# Patient Record
Sex: FEMALE | Race: BLACK OR AFRICAN AMERICAN | Employment: OTHER | URBAN - METROPOLITAN AREA
[De-identification: names, ages, dates, MRNs, and addresses within clinical notes are randomized per-mention and may not be internally consistent; named-entity substitution may affect disease eponyms.]

---

## 2017-05-01 ENCOUNTER — HOSPITAL ENCOUNTER (EMERGENCY)
Age: 57
Discharge: HOME | End: 2017-05-01
Attending: EMERGENCY MEDICINE | Admitting: EMERGENCY MEDICINE
Payer: COMMERCIAL

## 2017-05-01 VITALS
TEMPERATURE: 98.4 F | BODY MASS INDEX: 28.12 KG/M2 | HEIGHT: 66 IN | OXYGEN SATURATION: 99 % | WEIGHT: 175 LBS | SYSTOLIC BLOOD PRESSURE: 178 MMHG | DIASTOLIC BLOOD PRESSURE: 101 MMHG | HEART RATE: 77 BPM | RESPIRATION RATE: 16 BRPM

## 2017-05-01 DIAGNOSIS — I10 ESSENTIAL HYPERTENSION: ICD-10-CM

## 2017-05-01 DIAGNOSIS — G43.009 NONINTRACTABLE MIGRAINE, UNSPECIFIED MIGRAINE TYPE: Primary | ICD-10-CM

## 2017-05-01 DIAGNOSIS — M54.6 ACUTE LEFT-SIDED THORACIC BACK PAIN: ICD-10-CM

## 2017-05-01 PROCEDURE — 74011250636 HC RX REV CODE- 250/636: Performed by: PHYSICIAN ASSISTANT

## 2017-05-01 PROCEDURE — 99284 EMERGENCY DEPT VISIT MOD MDM: CPT

## 2017-05-01 PROCEDURE — 74011250637 HC RX REV CODE- 250/637: Performed by: PHYSICIAN ASSISTANT

## 2017-05-01 PROCEDURE — 96372 THER/PROPH/DIAG INJ SC/IM: CPT

## 2017-05-01 RX ORDER — TRAMADOL HYDROCHLORIDE 50 MG/1
50 TABLET ORAL
Qty: 15 TAB | Refills: 0 | Status: SHIPPED | OUTPATIENT
Start: 2017-05-01 | End: 2019-07-29

## 2017-05-01 RX ORDER — KETOROLAC TROMETHAMINE 30 MG/ML
30 INJECTION, SOLUTION INTRAMUSCULAR; INTRAVENOUS
Status: COMPLETED | OUTPATIENT
Start: 2017-05-01 | End: 2017-05-01

## 2017-05-01 RX ORDER — AMLODIPINE BESYLATE 5 MG/1
5 TABLET ORAL
Status: COMPLETED | OUTPATIENT
Start: 2017-05-01 | End: 2017-05-01

## 2017-05-01 RX ORDER — PROPRANOLOL HYDROCHLORIDE 10 MG/1
80 TABLET ORAL
Status: COMPLETED | OUTPATIENT
Start: 2017-05-01 | End: 2017-05-01

## 2017-05-01 RX ORDER — LISINOPRIL 20 MG/1
20 TABLET ORAL
Status: COMPLETED | OUTPATIENT
Start: 2017-05-01 | End: 2017-05-01

## 2017-05-01 RX ORDER — BUTALBITAL, ACETAMINOPHEN AND CAFFEINE 50; 325; 40 MG/1; MG/1; MG/1
2 TABLET ORAL
Status: COMPLETED | OUTPATIENT
Start: 2017-05-01 | End: 2017-05-01

## 2017-05-01 RX ORDER — BUTALBITAL, ACETAMINOPHEN AND CAFFEINE 300; 40; 50 MG/1; MG/1; MG/1
1 CAPSULE ORAL
Qty: 15 CAP | Refills: 0 | Status: SHIPPED | OUTPATIENT
Start: 2017-05-01 | End: 2019-07-29

## 2017-05-01 RX ADMIN — PROPRANOLOL HYDROCHLORIDE 80 MG: 10 TABLET ORAL at 17:16

## 2017-05-01 RX ADMIN — BUTALBITAL, ACETAMINOPHEN, AND CAFFEINE 2 TABLET: 50; 325; 40 TABLET ORAL at 17:06

## 2017-05-01 RX ADMIN — AMLODIPINE BESYLATE 5 MG: 5 TABLET ORAL at 17:06

## 2017-05-01 RX ADMIN — LISINOPRIL 20 MG: 20 TABLET ORAL at 17:06

## 2017-05-01 RX ADMIN — KETOROLAC TROMETHAMINE 30 MG: 30 INJECTION, SOLUTION INTRAMUSCULAR at 18:25

## 2017-05-01 NOTE — ED NOTES
Bedside shift change report given to GENO An RN (oncoming nurse) by SHERYL Brown RN (offgoing nurse). Report included the following information SBAR.

## 2017-05-01 NOTE — ED TRIAGE NOTES
Patient presents with c/o \"throbbing\" left upper back pain. Hx of shingles. Denies any recent injury. Also has c/o migraine headache unrelieved with \"normal\" headaches medications prescribed to patient.

## 2017-05-01 NOTE — ED NOTES
According to pt hear for HA and back pain. Pt during assessment B/P elevated. Emergency Department Nursing Plan of Care       The Nursing Plan of Care is developed from the Nursing assessment and Emergency Department Attending provider initial evaluation. The plan of care may be reviewed in the ED Provider note.     The Plan of Care was developed with the following considerations:   Patient / Family readiness to learn indicated by:verbalized understanding  Persons(s) to be included in education: patient  Barriers to Learning/Limitations:No    Signed     Tina Main RN    5/1/2017   5:12 PM

## 2017-05-01 NOTE — ED NOTES
Pt educated about improving her diet, decreasing smoking and taking her med's as prescribe. Pt acknowledge understanding and will try to improve.

## 2017-05-01 NOTE — ED PROVIDER NOTES
Patient is a 59 y.o. female presenting with migraines. Migraine    Pertinent negatives include no fever, no palpitations, no shortness of breath, no weakness, no nausea and no vomiting. To ED with complaints of typical migraine HA and left upper back pain. Early this AM (about 3am) awoke with \"my typical migraine\" describes as vice-like to both temples. Some photophobia. No nausea/vomiting. Minimal improvement with zomig. No vision changes. No SOB. Also notes has had several weeks of left mid lateral back pain. This is similar to pain she had last year and was evaluated by her pcp with renal US and CXR which were \"normal\". No trauma. This pain is constant ache with clear link to deep breath or twisting torso, reaching overhead with left arm, or sudden sneeze. Has tried some OTC meds for this with minimal effect. No past medical history on file. No past surgical history on file. No family history on file. Pt lists that she is on Norvasc, Zomig, Lisinopril, Propranolol and Vitamin D-3, although has missed some of her Propranolol and has not taken anything today. Her list includes following blood pressure medications:    Norvasc 5mg / D  Lisinopril 20mg/D  Propranolol 80 mg/D   Social History     Social History    Marital status:      Spouse name: N/A    Number of children: N/A    Years of education: N/A     Occupational History    Not on file. Social History Main Topics    Smoking status: Not on file    Smokeless tobacco: Not on file    Alcohol use Not on file    Drug use: Not on file    Sexual activity: Not on file     Other Topics Concern    Not on file     Social History Narrative         ALLERGIES: Shellfish derived; Iodine; and Codeine    Review of Systems   Constitutional: Negative for chills and fever. HENT: Negative for congestion, dental problem, ear discharge, hearing loss, rhinorrhea and sore throat. Eyes: Negative for pain and discharge.    Respiratory: Negative for cough, chest tightness, shortness of breath, wheezing and stridor. Cardiovascular: Negative for chest pain, palpitations and leg swelling. Gastrointestinal: Negative for abdominal pain, nausea and vomiting. Genitourinary: Negative for dysuria, frequency and urgency. Musculoskeletal: Positive for back pain. Negative for gait problem, joint swelling and neck pain. Skin: Negative for rash and wound. Neurological: Negative for seizures, syncope, weakness, light-headedness, numbness and headaches. Psychiatric/Behavioral: Negative for confusion and hallucinations. The patient is not nervous/anxious. All other systems reviewed and are negative. Vitals:    05/01/17 1716 05/01/17 1730 05/01/17 1800 05/01/17 1925   BP: (!) 192/101 (!) 196/102 (!) 165/104 158/67   Pulse: 78   74   Resp: 16   16   Temp:       SpO2:    99%   Weight:       Height:                Physical Exam   Constitutional: She is oriented to person, place, and time. She appears well-developed and well-nourished. HENT:   Head: Normocephalic and atraumatic. Right Ear: External ear normal.   Left Ear: External ear normal.   Nose: Nose normal.   Mouth/Throat: Oropharynx is clear and moist.   Eyes: Conjunctivae and EOM are normal. Pupils are equal, round, and reactive to light. Neck: Normal range of motion. Neck supple. Cardiovascular: Normal rate, regular rhythm and normal heart sounds. Pulmonary/Chest: Effort normal and breath sounds normal. She has no wheezes. She has no rales. Left Back:   No bony tenderness to T or L spine. No rib tenderness. No visible rash. Reproducible tenderness to L mid clavicular and lateral chest wall tenderness. Pain with twisting torso to right. Reproduces pain when reaches left arm overhead. Shoulder NT. Abdominal: Soft. Bowel sounds are normal. There is no tenderness. There is no rebound and no guarding. Musculoskeletal: Normal range of motion.    Neurological: She is alert and oriented to person, place, and time. She has normal strength and normal reflexes. She displays no tremor. She exhibits normal muscle tone. She displays a negative Romberg sign. Coordination normal.   Reflex Scores:       Tricep reflexes are 2+ on the right side and 2+ on the left side. Brachioradialis reflexes are 2+ on the right side and 2+ on the left side. Patellar reflexes are 2+ on the right side and 2+ on the left side. CN 3-12 intact. Speech clear. Skin: Skin is warm and dry. Psychiatric: She has a normal mood and affect. Her behavior is normal.            MDM  Number of Diagnoses or Management Options  Diagnosis management comments: DDX:  Migraine HA, Tension HA, post herpetic neuralgia, HA r/t HTN  Return of similar pain pattern after one year and reproducible nature of pain argue against more serious diagnosis. ED Course       Procedures             5:43 PM  Recheck Ms. Ross [place]. Sleeping. On waking notes she is feeling much better. HA minimal \"only a little behind my eyes\". Back pain still there. Will give Toradol. 8:10 PM  Feels better. Still with some back pain, but tolerable.

## 2017-05-02 NOTE — DISCHARGE INSTRUCTIONS
High Blood Pressure: Care Instructions  Your Care Instructions  If your blood pressure is usually above 140/90, you have high blood pressure, or hypertension. That means the top number is 140 or higher or the bottom number is 90 or higher, or both. Despite what a lot of people think, high blood pressure usually doesn't cause headaches or make you feel dizzy or lightheaded. It usually has no symptoms. But it does increase your risk for heart attack, stroke, and kidney or eye damage. The higher your blood pressure, the more your risk increases. Your doctor will give you a goal for your blood pressure. Your goal will be based on your health and your age. An example of a goal is to keep your blood pressure below 140/90. Lifestyle changes, such as eating healthy and being active, are always important to help lower blood pressure. You might also take medicine to reach your blood pressure goal.  Follow-up care is a key part of your treatment and safety. Be sure to make and go to all appointments, and call your doctor if you are having problems. It's also a good idea to know your test results and keep a list of the medicines you take. How can you care for yourself at home? Medical treatment  · If you stop taking your medicine, your blood pressure will go back up. You may take one or more types of medicine to lower your blood pressure. Be safe with medicines. Take your medicine exactly as prescribed. Call your doctor if you think you are having a problem with your medicine. · Talk to your doctor before you start taking aspirin every day. Aspirin can help certain people lower their risk of a heart attack or stroke. But taking aspirin isn't right for everyone, because it can cause serious bleeding. · See your doctor regularly. You may need to see the doctor more often at first or until your blood pressure comes down.   · If you are taking blood pressure medicine, talk to your doctor before you take decongestants or anti-inflammatory medicine, such as ibuprofen. Some of these medicines can raise blood pressure. · Learn how to check your blood pressure at home. Lifestyle changes  · Stay at a healthy weight. This is especially important if you put on weight around the waist. Losing even 10 pounds can help you lower your blood pressure. · If your doctor recommends it, get more exercise. Walking is a good choice. Bit by bit, increase the amount you walk every day. Try for at least 30 minutes on most days of the week. You also may want to swim, bike, or do other activities. · Avoid or limit alcohol. Talk to your doctor about whether you can drink any alcohol. · Try to limit how much sodium you eat to less than 2,300 milligrams (mg) a day. Your doctor may ask you to try to eat less than 1,500 mg a day. · Eat plenty of fruits (such as bananas and oranges), vegetables, legumes, whole grains, and low-fat dairy products. · Lower the amount of saturated fat in your diet. Saturated fat is found in animal products such as milk, cheese, and meat. Limiting these foods may help you lose weight and also lower your risk for heart disease. · Do not smoke. Smoking increases your risk for heart attack and stroke. If you need help quitting, talk to your doctor about stop-smoking programs and medicines. These can increase your chances of quitting for good. When should you call for help? Call 911 anytime you think you may need emergency care. This may mean having symptoms that suggest that your blood pressure is causing a serious heart or blood vessel problem. Your blood pressure may be over 180/110. For example, call 911 if:  · You have symptoms of a heart attack. These may include:  ¨ Chest pain or pressure, or a strange feeling in the chest.  ¨ Sweating. ¨ Shortness of breath. ¨ Nausea or vomiting. ¨ Pain, pressure, or a strange feeling in the back, neck, jaw, or upper belly or in one or both shoulders or arms.   ¨ Lightheadedness or sudden weakness. ¨ A fast or irregular heartbeat. · You have symptoms of a stroke. These may include:  ¨ Sudden numbness, tingling, weakness, or loss of movement in your face, arm, or leg, especially on only one side of your body. ¨ Sudden vision changes. ¨ Sudden trouble speaking. ¨ Sudden confusion or trouble understanding simple statements. ¨ Sudden problems with walking or balance. ¨ A sudden, severe headache that is different from past headaches. · You have severe back or belly pain. Do not wait until your blood pressure comes down on its own. Get help right away. Call your doctor now or seek immediate care if:  · Your blood pressure is much higher than normal (such as 180/110 or higher), but you don't have symptoms. · You think high blood pressure is causing symptoms, such as:  ¨ Severe headache. ¨ Blurry vision. Watch closely for changes in your health, and be sure to contact your doctor if:  · Your blood pressure measures 140/90 or higher at least 2 times. That means the top number is 140 or higher or the bottom number is 90 or higher, or both. · You think you may be having side effects from your blood pressure medicine. · Your blood pressure is usually normal, but it goes above normal at least 2 times. Where can you learn more? Go to http://cortez-onelia.info/. Enter O834 in the search box to learn more about \"High Blood Pressure: Care Instructions. \"  Current as of: August 8, 2016  Content Version: 11.2  © 4727-4048 [employer]. Care instructions adapted under license by [employer] (which disclaims liability or warranty for this information). If you have questions about a medical condition or this instruction, always ask your healthcare professional. [place] [address] any warranty or liability for your use of this information.        Migraine Headache: Care Instructions  Your Care Instructions  Migraines are painful, throbbing headaches that often start on one side of the head. They may cause nausea and vomiting and make you sensitive to light, sound, or smell. Without treatment, migraines can last from 4 hours to a few days. Medicines can help prevent migraines or stop them after they have started. Your doctor can help you find which ones work best for you. Follow-up care is a key part of your treatment and safety. Be sure to make and go to all appointments, and call your doctor if you are having problems. It's also a good idea to know your test results and keep a list of the medicines you take. How can you care for yourself at home? · Do not drive if you have taken a prescription pain medicine. · Rest in a quiet, dark room until your headache is gone. Close your eyes, and try to relax or go to sleep. Don't watch TV or read. · Put a cold, moist cloth or cold pack on the painful area for 10 to 20 minutes at a time. Put a thin cloth between the cold pack and your skin. · Use a warm, moist towel or a heating pad set on low to relax tight shoulder and neck muscles. · Have someone gently massage your neck and shoulders. · Take your medicines exactly as prescribed. Call your doctor if you think you are having a problem with your medicine. You will get more details on the specific medicines your doctor prescribes. · Be careful not to take pain medicine more often than the instructions allow. You could get worse or more frequent headaches when the medicine wears off. To prevent migraines  · Keep a headache diary so you can figure out what triggers your headaches. Avoiding triggers may help you prevent headaches. Record when each headache began, how long it lasted, and what the pain was like. (Was it throbbing, aching, stabbing, or dull?) Write down any other symptoms you had with the headache, such as nausea, flashing lights or dark spots, or sensitivity to bright light or loud noise. Note if the headache occurred near your period. List anything that might have triggered the headache. Triggers may include certain foods (chocolate, cheese, wine) or odors, smoke, bright light, stress, or lack of sleep. · If your doctor has prescribed medicine for your migraines, take it as directed. You may have medicine that you take only when you get a migraine and medicine that you take all the time to help prevent migraines. ¨ If your doctor has prescribed medicine for when you get a headache, take it at the first sign of a migraine, unless your doctor has given you other instructions. ¨ If your doctor has prescribed medicine to prevent migraines, take it exactly as prescribed. Call your doctor if you think you are having a problem with your medicine. · Find healthy ways to deal with stress. Migraines are most common during or right after stressful times. Take time to relax before and after you do something that has caused a migraine in the past.  · Try to keep your muscles relaxed by keeping good posture. Check your jaw, face, neck, and shoulder muscles for tension. Try to relax them. When you sit at a desk, change positions often. And make sure to stretch for 30 seconds each hour. · Get plenty of sleep and exercise. · Eat meals on a regular schedule. Avoid foods and drinks that often trigger migraines. These include chocolate, alcohol (especially red wine and port), aspartame, monosodium glutamate (MSG), and some additives found in foods (such as hot dogs, rowell, cold cuts, aged cheeses, and pickled foods). · Limit caffeine. Don't drink too much coffee, tea, or soda. But don't quit caffeine suddenly. That can also give you migraines. · Do not smoke or allow others to smoke around you. If you need help quitting, talk to your doctor about stop-smoking programs and medicines. These can increase your chances of quitting for good.   · If you are taking birth control pills or hormone therapy, talk to your doctor about whether they are triggering your migraines. When should you call for help? Call 911 anytime you think you may need emergency care. For example, call if:  · You have signs of a stroke. These may include:  ¨ Sudden numbness, paralysis, or weakness in your face, arm, or leg, especially on only one side of your body. ¨ Sudden vision changes. ¨ Sudden trouble speaking. ¨ Sudden confusion or trouble understanding simple statements. ¨ Sudden problems with walking or balance. ¨ A sudden, severe headache that is different from past headaches. Call your doctor now or seek immediate medical care if:  · You have new or worse nausea and vomiting. · You have a new or higher fever. · Your headache gets much worse. Watch closely for changes in your health, and be sure to contact your doctor if:  · You are not getting better after 2 days (48 hours). Where can you learn more? Go to http://cortezXeroundonelia.info/. Enter D442 in the search box to learn more about \"Migraine Headache: Care Instructions. \"  Current as of: October 14, 2016  Content Version: 11.2  © 1067-2138 [employer]. Care instructions adapted under license by [employer] (which disclaims liability or warranty for this information). If you have questions about a medical condition or this instruction, always ask your healthcare professional. [address] any warranty or liability for your use of this information. Back Pain: Care Instructions  Your Care Instructions    Back pain has many possible causes. It is often related to problems with muscles and ligaments of the back. It may also be related to problems with the nerves, discs, or bones of the back. Moving, lifting, standing, sitting, or sleeping in an awkward way can strain the back. Sometimes you don't notice the injury until later. Arthritis is another common cause of back pain. Although it may hurt a lot, back pain usually improves on its own within several weeks. Most people recover in 12 weeks or less. Using good home treatment and being careful not to stress your back can help you feel better sooner. Follow-up care is a key part of your treatment and safety. Be sure to make and go to all appointments, and call your doctor if you are having problems. Its also a good idea to know your test results and keep a list of the medicines you take. How can you care for yourself at home? · Sit or lie in positions that are most comfortable and reduce your pain. Try one of these positions when you lie down:  ¨ Lie on your back with your knees bent and supported by large pillows. ¨ Lie on the floor with your legs on the seat of a sofa or chair. Rosio Maryam on your side with your knees and hips bent and a pillow between your legs. ¨ Lie on your stomach if it does not make pain worse. · Do not sit up in bed, and avoid soft couches and twisted positions. Bed rest can help relieve pain at first, but it delays healing. Avoid bed rest after the first day of back pain. · Change positions every 30 minutes. If you must sit for long periods of time, take breaks from sitting. Get up and walk around, or lie in a comfortable position. · Try using a heating pad on a low or medium setting for 15 to 20 minutes every 2 or 3 hours. Try a warm shower in place of one session with the heating pad. · You can also try an ice pack for 10 to 15 minutes every 2 to 3 hours. Put a thin cloth between the ice pack and your skin. · Take pain medicines exactly as directed. ¨ If the doctor gave you a prescription medicine for pain, take it as prescribed. ¨ If you are not taking a prescription pain medicine, ask your doctor if you can take an over-the-counter medicine. · Take short walks several times a day. You can start with 5 to 10 minutes, 3 or 4 times a day, and work up to longer walks. Walk on level surfaces and avoid hills and stairs until your back is better.   · Return to work and other activities as soon as you can. Continued rest without activity is usually not good for your back. · To prevent future back pain, do exercises to stretch and strengthen your back and stomach. Learn how to use good posture, safe lifting techniques, and proper body mechanics. When should you call for help? Call your doctor now or seek immediate medical care if:  · You have new or worsening numbness in your legs. · You have new or worsening weakness in your legs. (This could make it hard to stand up.)  · You lose control of your bladder or bowels. Watch closely for changes in your health, and be sure to contact your doctor if:  · Your pain gets worse. · You are not getting better after 2 weeks. Where can you learn more? Go to http://cortez-onelia.info/. Enter Z285 in the search box to learn more about \"Back Pain: Care Instructions. \"  Current as of: May 23, 2016  Content Version: 11.2  © 2625-5283 [employer]. Care instructions adapted under license by [employer] (which disclaims liability or warranty for this information). If you have questions about a medical condition or this instruction, always ask your healthcare professional. [place] [address] any warranty or liability for your use of this information.

## 2017-05-02 NOTE — ED NOTES
Patient given copy of dc instructions and two script(s). Patient verbalized understanding of instructions and script (s). Patient given a current medication reconciliation form and verbalized understanding of their medications. Patient verbalized understanding of the importance of discussing medications with  his or her physician or clinic when they follow up. Patient alert and oriented and in no acute distress. Pt verbalizes pain scale of 8 out of 10. Patient discharged home ambulatory without assistance. Wheelchair declined.

## 2017-08-02 ENCOUNTER — HOSPITAL ENCOUNTER (OUTPATIENT)
Dept: MAMMOGRAPHY | Age: 57
Discharge: HOME | End: 2017-08-02
Attending: FAMILY MEDICINE
Payer: COMMERCIAL

## 2017-08-02 DIAGNOSIS — Z12.31 VISIT FOR SCREENING MAMMOGRAM: ICD-10-CM

## 2017-08-02 PROCEDURE — 77067 SCR MAMMO BI INCL CAD: CPT

## 2018-12-20 ENCOUNTER — HOSPITAL ENCOUNTER (OUTPATIENT)
Dept: MAMMOGRAPHY | Age: 58
Discharge: HOME | End: 2018-12-20
Attending: FAMILY MEDICINE
Payer: COMMERCIAL

## 2018-12-20 DIAGNOSIS — Z12.39 SCREENING BREAST EXAMINATION: ICD-10-CM

## 2018-12-20 PROCEDURE — 77063 BREAST TOMOSYNTHESIS BI: CPT

## 2019-07-29 ENCOUNTER — HOSPITAL ENCOUNTER (EMERGENCY)
Age: 59
Discharge: HOME | End: 2019-07-29
Attending: EMERGENCY MEDICINE | Admitting: EMERGENCY MEDICINE
Payer: COMMERCIAL

## 2019-07-29 ENCOUNTER — APPOINTMENT (OUTPATIENT)
Dept: GENERAL RADIOLOGY | Age: 59
End: 2019-07-29
Attending: EMERGENCY MEDICINE
Payer: COMMERCIAL

## 2019-07-29 VITALS
WEIGHT: 173.28 LBS | BODY MASS INDEX: 28.87 KG/M2 | OXYGEN SATURATION: 99 % | HEIGHT: 65 IN | RESPIRATION RATE: 16 BRPM | HEART RATE: 64 BPM | DIASTOLIC BLOOD PRESSURE: 96 MMHG | SYSTOLIC BLOOD PRESSURE: 159 MMHG | TEMPERATURE: 96.5 F

## 2019-07-29 DIAGNOSIS — F17.200 SMOKER: ICD-10-CM

## 2019-07-29 DIAGNOSIS — M47.9 SPONDYLOSIS: ICD-10-CM

## 2019-07-29 DIAGNOSIS — M54.41 ACUTE RIGHT-SIDED LOW BACK PAIN WITH RIGHT-SIDED SCIATICA: Primary | ICD-10-CM

## 2019-07-29 PROCEDURE — 74011250637 HC RX REV CODE- 250/637: Performed by: PHYSICIAN ASSISTANT

## 2019-07-29 PROCEDURE — 72100 X-RAY EXAM L-S SPINE 2/3 VWS: CPT

## 2019-07-29 PROCEDURE — 99283 EMERGENCY DEPT VISIT LOW MDM: CPT

## 2019-07-29 PROCEDURE — 96372 THER/PROPH/DIAG INJ SC/IM: CPT

## 2019-07-29 PROCEDURE — 74011250636 HC RX REV CODE- 250/636: Performed by: PHYSICIAN ASSISTANT

## 2019-07-29 PROCEDURE — 74011636637 HC RX REV CODE- 636/637: Performed by: PHYSICIAN ASSISTANT

## 2019-07-29 RX ORDER — LISINOPRIL 20 MG/1
20 TABLET ORAL DAILY
COMMUNITY

## 2019-07-29 RX ORDER — KETOROLAC TROMETHAMINE 30 MG/ML
30 INJECTION, SOLUTION INTRAMUSCULAR; INTRAVENOUS
Status: COMPLETED | OUTPATIENT
Start: 2019-07-29 | End: 2019-07-29

## 2019-07-29 RX ORDER — AMLODIPINE BESYLATE 5 MG/1
5 TABLET ORAL DAILY
COMMUNITY
End: 2021-10-12

## 2019-07-29 RX ORDER — ATORVASTATIN CALCIUM 10 MG/1
10 TABLET, FILM COATED ORAL DAILY
COMMUNITY

## 2019-07-29 RX ORDER — HYDROCODONE BITARTRATE AND ACETAMINOPHEN 5; 325 MG/1; MG/1
1 TABLET ORAL
Qty: 10 TAB | Refills: 0 | Status: SHIPPED | OUTPATIENT
Start: 2019-07-29 | End: 2019-08-01

## 2019-07-29 RX ORDER — PREDNISONE 10 MG/1
TABLET ORAL
Qty: 21 TAB | Refills: 0 | Status: SHIPPED | OUTPATIENT
Start: 2019-07-29 | End: 2019-08-04

## 2019-07-29 RX ORDER — PROPRANOLOL HYDROCHLORIDE 80 MG/1
80 CAPSULE, EXTENDED RELEASE ORAL DAILY
COMMUNITY
End: 2021-10-12

## 2019-07-29 RX ORDER — METHOCARBAMOL 750 MG/1
750 TABLET, FILM COATED ORAL
Status: COMPLETED | OUTPATIENT
Start: 2019-07-29 | End: 2019-07-29

## 2019-07-29 RX ORDER — CYCLOBENZAPRINE HCL 5 MG
5 TABLET ORAL
COMMUNITY
End: 2019-07-29

## 2019-07-29 RX ORDER — CYCLOBENZAPRINE HCL 10 MG
5 TABLET ORAL
Qty: 20 TAB | Refills: 0 | Status: SHIPPED | OUTPATIENT
Start: 2019-07-29 | End: 2021-10-12

## 2019-07-29 RX ORDER — DICLOFENAC SODIUM 75 MG/1
25 TABLET, DELAYED RELEASE ORAL
COMMUNITY
End: 2019-11-27

## 2019-07-29 RX ORDER — PREDNISONE 20 MG/1
60 TABLET ORAL
Status: COMPLETED | OUTPATIENT
Start: 2019-07-29 | End: 2019-07-29

## 2019-07-29 RX ADMIN — PREDNISONE 60 MG: 20 TABLET ORAL at 08:46

## 2019-07-29 RX ADMIN — KETOROLAC TROMETHAMINE 30 MG: 30 INJECTION, SOLUTION INTRAMUSCULAR at 08:45

## 2019-07-29 RX ADMIN — METHOCARBAMOL TABLETS 750 MG: 750 TABLET, COATED ORAL at 08:45

## 2019-07-29 NOTE — DISCHARGE INSTRUCTIONS
Patient Education        Getting Back to Normal After Low Back Pain: Care Instructions  Your Care Instructions  Almost everyone has low back pain at some time. The good news is that most low back pain will go away in a few days or weeks with some basic self-care. Some people are afraid that doing too much may make their pain worse. In the past, people stayed in bed, thinking this would help their backs. Now doctors think that, in most cases, getting back to your normal activities is good for your back, as long as you avoid doing things that make your pain worse. Follow-up care is a key part of your treatment and safety. Be sure to make and go to all appointments, and call your doctor if you are having problems. It's also a good idea to know your test results and keep a list of the medicines you take. How can you care for yourself at home? Ease back into daily activities  · For the first day or two of pain, take it easy. But as soon as possible, get back to your normal daily life and activities. · Get gentle exercise, such as walking. Movement keeps your spine flexible and helps your muscles stay strong. · If you are an athlete, return to your activity carefully. Choose a low-impact option until your pain is under control. Avoid or change activities that cause pain  · Try to avoid too much bending, heavy lifting, or reaching. These movements put extra stress on your back. · In bed, try lying on your side with a pillow between your knees. Or lie on your back on the floor with a pillow under your knees. · When you sit, place a small pillow, a rolled-up towel, or a lumbar roll in the curve of your back for extra support. · Try putting one foot up on a stool or changing positions every few minutes if you have to stand still for a period of time. Pay attention to body mechanics and posture  Body mechanics are the way you use your body. Posture is the way you sit or stand. · Take extra care when you lift.  When you must lift, bend your knees and keep your back straight. Avoid twisting, and keep the load close to your body. · Stand or sit tall, with your shoulders back and your stomach pulled in to support your back. Get support when you need it  · Let people know when you need a helping hand. Get family members or friends to help out with tasks you cannot do right now. · Be honest with your doctor about how the pain affects you. · If you have had to take time off work, talk to your doctor and boss about a gradual [id number]-af-[id number] plan. Find out if there are other ways you could do your job to avoid hurting your back again. Reduce stress  Worrying about the pain can cause you to tense the muscles in your lower back. This in turn causes more pain. Here are a few things you can do to relax your mind and your muscles:  · Take 10 to 15 minutes to sit quietly and breathe deeply. Try to focus only on your breathing. If you cannot keep thoughts away, think about things that make you feel good. · Get involved in your favorite hobby, or try something new. · Talk to a friend, read a book, or listen to your favorite music. · Find a counselor you like and trust. Talk openly and honestly about your problems. Be willing to make some changes. When should you call for help? Call 911 anytime you think you may need emergency care. For example, call if:    · You are unable to move a leg at all.   [facility] your doctor now or seek immediate medical care if:    · You have new or worse symptoms in your legs, belly, or buttocks. Symptoms may include:  ? Numbness or tingling. ? Weakness. ? Pain.     · You lose bladder or bowel control.    Watch closely for changes in your health, and be sure to contact your doctor if:    · You have a fever, lose weight, or don't feel well.     · You are not getting better as expected. Where can you learn more? Go to http://cortez-onelia.info/.   Enter P310 in the search box to learn more about \"Getting Back to Normal After Low Back Pain: Care Instructions. \"  Current as of: September 20, 2018  Content Version: 12.1  © 0361-4501 [employer]. Care instructions adapted under license by [employer] (which disclaims liability or warranty for this information). If you have questions about a medical condition or this instruction, always ask your healthcare professional. [address] any warranty or liability for your use of this information. Patient Education        Sciatica: Care Instructions  Your Care Instructions    Sciatica (say \"ixa-UP-hr-kuh\") is an irritation of one of the sciatic nerves, which come from the spinal cord in the lower back. The sciatic nerves and their branches extend down through the buttock to the foot. Sciatica can develop when an injured disc in the back presses against a spinal nerve root. Its main symptom is pain, numbness, or weakness that is often worse in the leg or foot than in the back. Sciatica often will improve and go away with time. Early treatment usually includes medicines and exercises to relieve pain. Follow-up care is a key part of your treatment and safety. Be sure to make and go to all appointments, and call your doctor if you are having problems. It's also a good idea to know your test results and keep a list of the medicines you take. How can you care for yourself at home? · Take pain medicines exactly as directed. ? If the doctor gave you a prescription medicine for pain, take it as prescribed. ? If you are not taking a prescription pain medicine, ask your doctor if you can take an over-the-counter medicine. · Use heat or ice to relieve pain. ? To apply heat, put a warm water bottle, heating pad set on low, or warm cloth on your back. Do not go to sleep with a heating pad on your skin. ? To use ice, put ice or a cold pack on the area for 10 to 20 minutes at a time.  Put a thin cloth between the ice and your skin. · Avoid sitting if possible, unless it feels better than standing. · Alternate lying down with short walks. Increase your walking distance as you are able to without making your symptoms worse. · Do not do anything that makes your symptoms worse. When should you call for help? Call 911 anytime you think you may need emergency care. For example, call if:    · You are unable to move a leg at all.   [facility] your doctor now or seek immediate medical care if:    · You have new or worse symptoms in your legs or buttocks. Symptoms may include:  ? Numbness or tingling. ? Weakness. ? Pain.     · You lose bladder or bowel control.    Watch closely for changes in your health, and be sure to contact your doctor if:    · You are not getting better as expected. Where can you learn more? Go to http://cortez-onelia.info/. Enter [phone] in the search box to learn more about \"Sciatica: Care Instructions. \"  Current as of: September 20, 2018  Content Version: 12.1  © 5292-7296 [employer]. Care instructions adapted under license by [employer] (which disclaims liability or warranty for this information). If you have questions about a medical condition or this instruction, always ask your healthcare professional. [address] any warranty or liability for your use of this information. Patient Education        Stopping Smoking: Care Instructions  Your Care Instructions  Cigarette smokers crave the nicotine in cigarettes. Giving it up is much harder than simply changing a habit. Your body has to stop craving the nicotine. It is hard to quit, but you can do it. There are many tools that people use to quit smoking. You may find that combining tools works best for you. There are several steps to quitting. First you get ready to quit. Then you get support to help you. After that, you learn new skills and behaviors to become a nonsmoker.  For many people, a necessary step is getting and using medicine. Your doctor will help you set up the plan that best meets your needs. You may want to attend a smoking cessation program to help you quit smoking. When you choose a program, look for one that has proven success. Ask your doctor for ideas. You will greatly increase your chances of success if you take medicine as well as get counseling or join a cessation program.  Some of the changes you feel when you first quit tobacco are uncomfortable. Your body will miss the nicotine at first, and you may feel short-tempered and grumpy. You may have trouble sleeping or concentrating. Medicine can help you deal with these symptoms. You may struggle with changing your smoking habits and rituals. The last step is the tricky one: Be prepared for the smoking urge to continue for a time. This is a lot to deal with, but keep at it. You will feel better. Follow-up care is a key part of your treatment and safety. Be sure to make and go to all appointments, and call your doctor if you are having problems. It's also a good idea to know your test results and keep a list of the medicines you take. How can you care for yourself at home? · Ask your family, friends, and coworkers for support. You have a better chance of quitting if you have help and support. · Join a support group, such as Nicotine Anonymous, for people who are trying to quit smoking. · Consider signing up for a smoking cessation program, such as the American Lung Association's Freedom from Smoking program.  · Get text messaging support. Go to the website at www.smokefree. gov to sign up for the [facility] program.  · Set a quit date. Pick your date carefully so that it is not right in the middle of a big deadline or stressful time. Once you quit, do not even take a puff. Get rid of all ashtrays and lighters after your last cigarette. Clean your house and your clothes so that they do not smell of smoke.   · Learn how to be a nonsmoker. Think about ways you can avoid those things that make you reach for a cigarette. ? Avoid situations that put you at greatest risk for smoking. For some people, it is hard to have a drink with friends without smoking. For others, they might skip a coffee break with coworkers who smoke. ? Change your daily routine. Take a different route to work or eat a meal in a different place. · Cut down on stress. Calm yourself or release tension by doing an activity you enjoy, such as reading a book, taking a hot bath, or gardening. · Talk to your doctor or pharmacist about nicotine replacement therapy, which replaces the nicotine in your body. You still get nicotine but you do not use tobacco. Nicotine replacement products help you slowly reduce the amount of nicotine you need. These products come in several forms, many of them available over-the-counter:  ? Nicotine patches  ? Nicotine gum and lozenges  ? Nicotine inhaler  · Ask your doctor about bupropion (Wellbutrin) or varenicline (Chantix), which are prescription medicines. They do not contain nicotine. They help you by reducing withdrawal symptoms, such as stress and anxiety. · Some people find hypnosis, acupuncture, and massage helpful for ending the smoking habit. · Eat a healthy diet and get regular exercise. Having healthy habits will help your body move past its craving for nicotine. · Be prepared to keep trying. Most people are not successful the first few times they try to quit. Do not get mad at yourself if you smoke again. Make a list of things you learned and think about when you want to try again, such as next week, next month, or next year. Where can you learn more? Go to http://cortez-onelia.info/. Enter D288 in the search box to learn more about \"Stopping Smoking: Care Instructions. \"  Current as of: September 26, 2018  Content Version: 12.1  © 9389-1142 [employer].  Care instructions adapted under license by [employer] (which disclaims liability or warranty for this information). If you have questions about a medical condition or this instruction, always ask your healthcare professional. [address] any warranty or liability for your use of this information.

## 2019-07-29 NOTE — ED PROVIDER NOTES
EMERGENCY DEPARTMENT HISTORY AND PHYSICAL EXAM      Date: 7/29/2019  Patient Name: Pool Murphy    History of Presenting Illness     Chief Complaint   Patient presents with    Back Pain     pt complaining of right lower back pain radiating down to her leg that started last Thursday. pt states it has been going on for months     History Provided By: Patient    HPI: Pool Murphy, 62 y.o. female with PMHx significant for sciatica, presents by POV to the ED with cc of right sided low back pain that radiates down the right leg. Her symptoms started several months ago and have progressively worsened. Over the past 1 week her symptoms have been constant. Her discomfort increases with movement. She notes some numbness to the right third, fourth, and fifth toes. She is taken her scheduled medications to include diclofenac and Flexeril without relief. She also has attempted some home exercises and applied heat without improvement. She left voicemails for both her primary care doctor and [address], where she has been seen before, without return phone call this weekend. The patient notes a remote history of 2 back surgeries in the 1980s and 1990s. There are no other complaints, changes, or physical findings at this time. Social Hx: Tobacco (1/2 ppd), EtOH (social), Illicit drug use (denies)     PCP: Nelsy Sierra MD    No current facility-administered medications on file prior to encounter. Current Outpatient Medications on File Prior to Encounter   Medication Sig Dispense Refill    amLODIPine (NORVASC) 5 mg tablet Take 5 mg by mouth daily. Indications: high blood pressure      atorvastatin (LIPITOR) 10 mg tablet Take 10 mg by mouth daily. Indications: high cholesterol      diclofenac EC (VOLTAREN) 75 mg EC tablet Take 25 mg by mouth.  lisinopril (PRINIVIL, ZESTRIL) 20 mg tablet Take 20 mg by mouth daily.       propranolol LA (INDERAL LA) 80 mg SR capsule Take 80 mg by mouth daily. Past History     Past Medical History:  No past medical history on file. Past Surgical History:  No past surgical history on file. Family History:  No family history on file. Social History:  Social History     Tobacco Use    Smoking status: Current Every Day Smoker     Packs/day: 0.25   Substance Use Topics    Alcohol use: Yes    Drug use: No       Allergies: Allergies   Allergen Reactions    Shellfish Derived Angioedema    Iodine Swelling    Codeine Nausea Only         Review of Systems   Review of Systems   Constitutional: Negative for chills, diaphoresis and fever. HENT: Negative for congestion, ear pain, rhinorrhea and sore throat. Respiratory: Negative for cough and shortness of breath. Cardiovascular: Negative for chest pain. Gastrointestinal: Positive for constipation. Negative for abdominal pain, diarrhea, nausea and vomiting. Denies incontinence of bowel. She notes her last bowel movement was 5 days ago. Genitourinary: Negative for difficulty urinating, dysuria, frequency and hematuria. Denies incontinence or urinary retention. Musculoskeletal: Positive for back pain. Negative for arthralgias, gait problem and myalgias. Neurological: Positive for numbness. Negative for headaches. Denies saddle paresthesias. All other systems reviewed and are negative. Physical Exam   Physical Exam   Constitutional: She is oriented to person, place, and time. She appears well-developed and well-nourished. No distress. 62 y.o. -American female    HENT:   Head: Normocephalic and atraumatic. Eyes: Conjunctivae are normal. Right eye exhibits no discharge. Left eye exhibits no discharge. Neck: Normal range of motion. Neck supple. Cardiovascular: Normal rate, regular rhythm, normal heart sounds and intact distal pulses. No murmur heard. Pulmonary/Chest: Effort normal and breath sounds normal. No respiratory distress. Musculoskeletal:   BACK: Normal spinal curvatures. No step off or deformity. NT to palpation. Negative seated SLR bilaterally. Strength of the LE 5/5 and equal bilaterally. Patellar DTR's 2+ bilaterally. Ambulatory with pain. Neurological: She is alert and oriented to person, place, and time. Skin: Skin is warm and dry. She is not diaphoretic. Psychiatric: She has a normal mood and affect. Her behavior is normal.   Nursing note and vitals reviewed. Diagnostic Study Results     Labs - None    Radiologic Studies -   XR SPINE LUMB 2 OR 3 V   Final Result   IMPRESSION:      Moderate to severe spondylosis without evidence for acute abnormality           Medical Decision Making   I am the first provider for this patient. I reviewed the vital signs, available nursing notes, past medical history, past surgical history, family history and social history. Vital Signs-Reviewed the patient's vital signs. Patient Vitals for the past 12 hrs:   Temp Pulse Resp BP SpO2   07/29/19 0932 96.5 °F (35.8 °C) 64 16 (!) 159/96 99 %   07/29/19 0750 98.1 °F (36.7 °C) 84 18 (!) 179/116 100 %     Records Reviewed: Nursing Notes    Provider Notes (Medical Decision Making):   Sprain, strain, fracture, DDD, DJD, herniated disc, sciatica     ED Course:   Initial assessment performed. The patients presenting problems have been discussed, and they are in agreement with the care plan formulated and outlined with them. I have encouraged them to ask questions as they arise throughout their visit. Tobacco Counseling  Discussed the risks of smoking and the benefits of smoking cessation as well as the long term sequelae of smoking with the patient. The patient verbalized their understanding. Counseled patient for approximately 3 minutes. 9:19 AM  The patient has been re-evaluated. She notes that she is not feeling any better since receiving the medications in the ED.  The patient notes that she is driving home and thus is unable to take anything stronger for pain in the ED. Will call [facility] to arrange follow up for the patient. 9:22 AM   reviewed. The patient has not filled a controlled substance in the past 12 months. 9:30 AM  Called and spoke to ROSIE Stewart at [facility]. The next available appointment with PAULINA Morris was scheduled for Thurday, August 1st at 5 PM. Will alert patient to follow up appointment. Critical Care Time: None    Disposition:  DISCHARGE NOTE:  9:38 AM  The pt is ready for discharge. The pt's signs, symptoms, diagnosis, and discharge instructions have been discussed and pt has conveyed their understanding. The pt is to follow up as recommended or return to ER should their symptoms worsen. Plan has been discussed and pt is in agreement. PLAN:  1. Current Discharge Medication List      START taking these medications    Details   HYDROcodone-acetaminophen (NORCO) 5-325 mg per tablet Take 1 Tab by mouth every six (6) hours as needed for Pain for up to 3 days. Max Daily Amount: 4 Tabs. Qty: 10 Tab, Refills: 0    Associated Diagnoses: Acute right-sided low back pain with right-sided sciatica; Spondylosis      predniSONE (STERAPRED DS) 10 mg dose pack Standard 6 day taper  Qty: 21 Tab, Refills: 0         CONTINUE these medications which have CHANGED    Details   cyclobenzaprine (FLEXERIL) 10 mg tablet Take 0.5 Tabs by mouth three (3) times daily as needed for Muscle Spasm(s). Qty: 20 Tab, Refills: 0           2. Follow-up Information     Follow up With Specialties Details Why Contact Alivia Quinonez PA-C Orthopedic Surgery, Physician Assistant  Thursday, August 1st at [address] [facility] [address]  [phone]        3. Rest, heat, massage and gentle stretches  4. Avoid heavy lifting. Return to ED if worse     Diagnosis     Clinical Impression:   1. Acute right-sided low back pain with right-sided sciatica    2. Spondylosis    3. Smoker          Please note that this dictation was completed with [email or web address], the computer voice recognition software. Quite often unanticipated grammatical, syntax, homophones, and other interpretive errors are inadvertently transcribed by the computer software. Please disregards these errors. Please excuse any errors that have escaped final proofreading. This note will not be viewable in [address]

## 2019-07-29 NOTE — LETTER
[address] 
[facility] EMERGENCY DEPT 
[address] [place] [address] Joann Randlett [id number] [phone] Work/School Note Date: 7/29/2019 To Whom It May concern: 
 
Ani Munoz was seen and treated today in the emergency room by the following provider(s): 
Attending Provider: Sophia Cantu MD 
Physician Assistant: Andres Tolliver. Please excuse Ani Munoz from work today and tomorrow. Sincerely, Andres Kaiser

## 2019-08-03 ENCOUNTER — HOSPITAL ENCOUNTER (EMERGENCY)
Age: 59
Discharge: HOME | End: 2019-08-03
Attending: EMERGENCY MEDICINE
Payer: COMMERCIAL

## 2019-08-03 VITALS
HEART RATE: 70 BPM | OXYGEN SATURATION: 100 % | BODY MASS INDEX: 29.24 KG/M2 | WEIGHT: 175.49 LBS | SYSTOLIC BLOOD PRESSURE: 151 MMHG | RESPIRATION RATE: 16 BRPM | TEMPERATURE: 97.8 F | HEIGHT: 65 IN | DIASTOLIC BLOOD PRESSURE: 101 MMHG

## 2019-08-03 DIAGNOSIS — M54.50 LOW BACK PAIN RADIATING TO RIGHT LOWER EXTREMITY: Primary | ICD-10-CM

## 2019-08-03 DIAGNOSIS — M47.816 LUMBAR SPONDYLOSIS: ICD-10-CM

## 2019-08-03 DIAGNOSIS — M79.604 LOW BACK PAIN RADIATING TO RIGHT LOWER EXTREMITY: Primary | ICD-10-CM

## 2019-08-03 PROCEDURE — 96372 THER/PROPH/DIAG INJ SC/IM: CPT

## 2019-08-03 PROCEDURE — 99283 EMERGENCY DEPT VISIT LOW MDM: CPT

## 2019-08-03 PROCEDURE — 74011250636 HC RX REV CODE- 250/636: Performed by: PHYSICIAN ASSISTANT

## 2019-08-03 PROCEDURE — 74011250637 HC RX REV CODE- 250/637: Performed by: PHYSICIAN ASSISTANT

## 2019-08-03 RX ORDER — IBUPROFEN 400 MG/1
800 TABLET ORAL
Status: COMPLETED | OUTPATIENT
Start: 2019-08-03 | End: 2019-08-03

## 2019-08-03 RX ORDER — OXYCODONE AND ACETAMINOPHEN 7.5; 325 MG/1; MG/1
1 TABLET ORAL
Qty: 10 TAB | Refills: 0 | Status: SHIPPED | OUTPATIENT
Start: 2019-08-03 | End: 2019-08-07

## 2019-08-03 RX ORDER — GABAPENTIN 300 MG/1
300 CAPSULE ORAL 3 TIMES DAILY
Qty: 30 CAP | Refills: 0 | Status: SHIPPED | OUTPATIENT
Start: 2019-08-03 | End: 2019-11-27

## 2019-08-03 RX ORDER — FENTANYL CITRATE 50 UG/ML
50 INJECTION, SOLUTION INTRAMUSCULAR; INTRAVENOUS
Status: COMPLETED | OUTPATIENT
Start: 2019-08-03 | End: 2019-08-03

## 2019-08-03 RX ORDER — IBUPROFEN 800 MG/1
800 TABLET ORAL
Qty: 20 TAB | Refills: 0 | Status: SHIPPED | OUTPATIENT
Start: 2019-08-03 | End: 2019-08-10

## 2019-08-03 RX ADMIN — FENTANYL CITRATE 50 MCG: 50 INJECTION, SOLUTION INTRAMUSCULAR; INTRAVENOUS at 16:01

## 2019-08-03 RX ADMIN — IBUPROFEN 800 MG: 400 TABLET ORAL at 16:01

## 2019-08-03 NOTE — ED PROVIDER NOTES
EMERGENCY DEPARTMENT HISTORY AND PHYSICAL EXAM      Date: 8/3/2019  Patient Name: Joselito Woods    History of Presenting Illness     Chief Complaint   Patient presents with    Buttocks pain     per pt she was seen here on Monday and her pain is worse and is radiating to her ankle       History Provided By: Patient    HPI: Joselito Woods, 62 y.o. female presents ambulatory to the ED with cc of about a week or longer of 10 out of 10 constant, aching lower back pain that radiates down the right buttock and the right leg which is much worse with weightbearing. She tells me she was seen in this emergency department on Monday of this past week and then with Ortho this past Friday. She has been taking oral steroids and pain medication without any improvement of her symptoms. There has been no specific injury. Denies saddle anesthesia, abdominal pain, bowel or bladder symptoms, weakness, numbness or fever. There are no other complaints, changes, or physical findings at this time. PCP: Minal Witt MD    Current Outpatient Medications   Medication Sig Dispense Refill    gabapentin (NEURONTIN) 300 mg capsule Take 1 Cap by mouth three (3) times daily. Max Daily Amount: 900 mg. 30 Cap 0    oxyCODONE-acetaminophen (PERCOCET) 7.5-325 mg per tablet Take 1 Tab by mouth every eight (8) hours as needed for Pain for up to 4 days. Max Daily Amount: 3 Tabs. Indications: pain 10 Tab 0    ibuprofen (MOTRIN) 800 mg tablet Take 1 Tab by mouth every eight (8) hours as needed for Pain for up to 7 days. 20 Tab 0    amLODIPine (NORVASC) 5 mg tablet Take 5 mg by mouth daily. Indications: high blood pressure      atorvastatin (LIPITOR) 10 mg tablet Take 10 mg by mouth daily. Indications: high cholesterol      diclofenac EC (VOLTAREN) 75 mg EC tablet Take 25 mg by mouth.  lisinopril (PRINIVIL, ZESTRIL) 20 mg tablet Take 20 mg by mouth daily.       propranolol LA (INDERAL LA) 80 mg SR capsule Take 80 mg by mouth daily.      predniSONE (STERAPRED DS) 10 mg dose pack Standard 6 day taper 21 Tab 0    cyclobenzaprine (FLEXERIL) 10 mg tablet Take 0.5 Tabs by mouth three (3) times daily as needed for Muscle Spasm(s). 20 Tab 0     Past History     Past Medical History:  No past medical history on file. Past Surgical History:  No past surgical history on file. Family History:  No family history on file. Social History:  Social History     Tobacco Use    Smoking status: Current Every Day Smoker     Packs/day: 0.25   Substance Use Topics    Alcohol use: Yes    Drug use: No       Allergies: Allergies   Allergen Reactions    Shellfish Derived Angioedema    Iodine Swelling    Codeine Nausea Only     Review of Systems   Review of Systems   Constitutional: Negative for fatigue and fever. HENT: Negative for ear pain and sore throat. Eyes: Negative for pain, redness and visual disturbance. Respiratory: Negative for cough and shortness of breath. Cardiovascular: Negative for chest pain and palpitations. Gastrointestinal: Negative for abdominal pain, nausea and vomiting. Genitourinary: Negative for dysuria, frequency and urgency. Musculoskeletal: Positive for back pain (That radiates down the right buttock and leg). Negative for gait problem, neck pain and neck stiffness. Skin: Negative for rash and wound. Neurological: Negative for dizziness, weakness, light-headedness, numbness and headaches. Physical Exam   Physical Exam   Constitutional: She is oriented to person, place, and time. She appears well-developed and well-nourished. Non-toxic appearance. No distress. HENT:   Head: Normocephalic and atraumatic. Right Ear: External ear normal.   Left Ear: External ear normal.   Nose: Nose normal.   Mouth/Throat: Uvula is midline. No trismus in the jaw. Eyes: Pupils are equal, round, and reactive to light. Conjunctivae and EOM are normal. No scleral icterus.    Neck: Normal range of motion and full passive range of motion without pain. Cardiovascular: Normal rate and regular rhythm. Pulmonary/Chest: Effort normal. No accessory muscle usage. No tachypnea. No respiratory distress. She has no decreased breath sounds. She has no wheezes. Abdominal: Soft. There is no tenderness. Musculoskeletal: Normal range of motion. Back:    LOWER BACK:  No bruising, redness or swelling. No step off. Diffuse discomfort that radiates down the right buttock and leg  No CVA tenderness   Neurological: She is alert and oriented to person, place, and time. She is not disoriented. No cranial nerve deficit. GCS eye subscore is 4. GCS verbal subscore is 5. GCS motor subscore is 6. Positive seated SLR on the right  Symmetric bulk and tone of both LE  Normal sensation along all LE dermatomes  Ambulates independently with an antalgic gait   Skin: Skin is intact. No rash noted. Psychiatric: She has a normal mood and affect. Her speech is normal.   Nursing note and vitals reviewed. Diagnostic Study Results     Labs -   No results found for this or any previous visit (from the past 12 hour(s)). Radiologic Studies -   No orders to display     CT Results  (Last 48 hours)    None        CXR Results  (Last 48 hours)    None        Medical Decision Making   I am the first provider for this patient. I reviewed the vital signs, available nursing notes, past medical history, past surgical history, family history and social history. Vital Signs-Reviewed the patient's vital signs.   Patient Vitals for the past 12 hrs:   Temp Pulse Resp BP SpO2   08/03/19 1633  70 16 (!) 151/101    08/03/19 1538 97.8 °F (36.6 °C) 78 18 181/81 100 %       Pulse Oximetry Analysis - 100% on room air    Records Reviewed: Nursing Notes, Old Medical Records, Previous Radiology Studies, Previous Laboratory Studies and     Provider Notes (Medical Decision Making):   Recent medical records and plain films reviewed from 29 July 2019; there has been no new injury;  reviewed; patient is independently ambulatory albeit with a antalgic gait; presentation not consistent with cord compression/cauda equina; additional testing deferred; will refer to orthospine / neurosurgery; return precautions    ED Course:   Initial assessment performed. The patients presenting problems have been discussed, and they are in agreement with the care plan formulated and outlined with them. I have encouraged them to ask questions as they arise throughout their visit. Disposition:  Discharge    PLAN:  1. Discharge Medication List as of 8/3/2019  4:32 PM      START taking these medications    Details   gabapentin (NEURONTIN) 300 mg capsule Take 1 Cap by mouth three (3) times daily. Max Daily Amount: 900 mg., Print, Disp-30 Cap, R-0      oxyCODONE-acetaminophen (PERCOCET) 7.5-325 mg per tablet Take 1 Tab by mouth every eight (8) hours as needed for Pain for up to 4 days. Max Daily Amount: 3 Tabs. Indications: pain, Print, Disp-10 Tab, R-0      ibuprofen (MOTRIN) 800 mg tablet Take 1 Tab by mouth every eight (8) hours as needed for Pain for up to 7 days. , Print, Disp-20 Tab, R-0         CONTINUE these medications which have NOT CHANGED    Details   amLODIPine (NORVASC) 5 mg tablet Take 5 mg by mouth daily. Indications: high blood pressure, Historical Med      atorvastatin (LIPITOR) 10 mg tablet Take 10 mg by mouth daily. Indications: high cholesterol, Historical Med      diclofenac EC (VOLTAREN) 75 mg EC tablet Take 25 mg by mouth., Historical Med      lisinopril (PRINIVIL, ZESTRIL) 20 mg tablet Take 20 mg by mouth daily. , Historical Med      propranolol LA (INDERAL LA) 80 mg SR capsule Take 80 mg by mouth daily. , Historical Med      predniSONE (STERAPRED DS) 10 mg dose pack Standard 6 day taper, Print, Disp-21 Tab, R-0      cyclobenzaprine (FLEXERIL) 10 mg tablet Take 0.5 Tabs by mouth three (3) times daily as needed for Muscle Spasm(s). , Print, Disp-20 Tab, R-0 2.   Follow-up Information     Follow up With Specialties Details Why Contact Info    Nevaeh Betancourt MD Orthopedic Surgery Schedule an appointment as soon as possible for a visit Kristina Stubbs / Timi Party: as needed [id number] [facility]  [phone] [facility]      Lencho Sanchez MD Neurosurgery Schedule an appointment as soon as possible for a visit NEUROSURGERY: or follow up as needed [address]  [phone]          Return to ED if worse     Diagnosis     Clinical Impression:   1. Low back pain radiating to right lower extremity    2.  Lumbar spondylosis

## 2019-08-03 NOTE — LETTER
[address] 
\[facility] [place] [facility]\"" EMERGENCY DEPT 
[address] [facility] [place] [address] [phone] Work/School Note Date: 8/3/2019 To Whom It May concern: 
 
Willa De Santiago was seen and treated today in the emergency room by the following provider(s): 
Attending Provider: Patricia Curran MD 
Physician Assistant: CARLITA Tolentino. Willa De Santiago may return to work on [address] Sincerely, CARLITA Miller

## 2019-09-24 ENCOUNTER — HOSPITAL ENCOUNTER (OUTPATIENT)
Dept: MRI IMAGING | Age: 59
Discharge: HOME | End: 2019-09-24
Attending: ORTHOPAEDIC SURGERY
Payer: COMMERCIAL

## 2019-09-24 DIAGNOSIS — M51.36 DISC DEGENERATION, LUMBAR: ICD-10-CM

## 2019-09-24 DIAGNOSIS — M54.50 LOW BACK PAIN: ICD-10-CM

## 2019-09-24 PROCEDURE — 72148 MRI LUMBAR SPINE W/O DYE: CPT

## 2019-11-27 ENCOUNTER — HOSPITAL ENCOUNTER (OUTPATIENT)
Dept: PREADMISSION TESTING | Age: 59
Discharge: HOME | End: 2019-11-27
Payer: COMMERCIAL

## 2019-11-27 VITALS
TEMPERATURE: 98.2 F | HEART RATE: 88 BPM | SYSTOLIC BLOOD PRESSURE: 177 MMHG | WEIGHT: 174 LBS | HEIGHT: 66 IN | BODY MASS INDEX: 27.97 KG/M2 | DIASTOLIC BLOOD PRESSURE: 93 MMHG

## 2019-11-27 LAB
ABO + RH BLD: NORMAL
ANION GAP SERPL CALC-SCNC: 5 MMOL/L (ref 5–15)
APPEARANCE UR: CLEAR
BACTERIA URNS QL MICRO: NEGATIVE /HPF
BILIRUB UR QL: NEGATIVE
BLOOD GROUP ANTIBODIES SERPL: NORMAL
BUN SERPL-MCNC: 9 MG/DL (ref 6–20)
BUN/CREAT SERPL: 12 (ref 12–20)
CALCIUM SERPL-MCNC: 9.2 MG/DL (ref 8.5–10.1)
CHLORIDE SERPL-SCNC: 108 MMOL/L (ref 97–108)
CO2 SERPL-SCNC: 29 MMOL/L (ref 21–32)
COLOR UR: ABNORMAL
CREAT SERPL-MCNC: 0.74 MG/DL (ref 0.55–1.02)
EPITH CASTS URNS QL MICRO: ABNORMAL /LPF
ERYTHROCYTE [DISTWIDTH] IN BLOOD BY AUTOMATED COUNT: 15.4 % (ref 11.5–14.5)
EST. AVERAGE GLUCOSE BLD GHB EST-MCNC: 100 MG/DL
GLUCOSE SERPL-MCNC: 80 MG/DL (ref 65–100)
GLUCOSE UR STRIP.AUTO-MCNC: NEGATIVE MG/DL
HBA1C MFR BLD: 5.1 % (ref 4–5.6)
HCT VFR BLD AUTO: 37.5 % (ref 35–47)
HGB BLD-MCNC: 12 G/DL (ref 11.5–16)
HGB UR QL STRIP: ABNORMAL
HYALINE CASTS URNS QL MICRO: ABNORMAL /LPF (ref 0–5)
INR PPP: 1 (ref 0.9–1.1)
KETONES UR QL STRIP.AUTO: NEGATIVE MG/DL
LEUKOCYTE ESTERASE UR QL STRIP.AUTO: NEGATIVE
MCH RBC QN AUTO: 27.3 PG (ref 26–34)
MCHC RBC AUTO-ENTMCNC: 32 G/DL (ref 30–36.5)
MCV RBC AUTO: 85.4 FL (ref 80–99)
NITRITE UR QL STRIP.AUTO: NEGATIVE
NRBC # BLD: 0 K/UL (ref 0–0.01)
NRBC BLD-RTO: 0 PER 100 WBC
PH UR STRIP: 6 [PH] (ref 5–8)
PLATELET # BLD AUTO: 280 K/UL (ref 150–400)
PMV BLD AUTO: 10.5 FL (ref 8.9–12.9)
POTASSIUM SERPL-SCNC: 3.5 MMOL/L (ref 3.5–5.1)
PROT UR STRIP-MCNC: 30 MG/DL
PROTHROMBIN TIME: 10.6 SEC (ref 9–11.1)
RBC # BLD AUTO: 4.39 M/UL (ref 3.8–5.2)
RBC #/AREA URNS HPF: ABNORMAL /HPF (ref 0–5)
SODIUM SERPL-SCNC: 142 MMOL/L (ref 136–145)
SP GR UR REFRACTOMETRY: 1.01 (ref 1–1.03)
SPECIMEN EXP DATE BLD: NORMAL
UA: UC IF INDICATED,UAUC: ABNORMAL
UROBILINOGEN UR QL STRIP.AUTO: 1 EU/DL (ref 0.2–1)
WBC # BLD AUTO: 6.5 K/UL (ref 3.6–11)
WBC URNS QL MICRO: ABNORMAL /HPF (ref 0–4)

## 2019-11-27 PROCEDURE — 85027 COMPLETE CBC AUTOMATED: CPT

## 2019-11-27 PROCEDURE — 81001 URINALYSIS AUTO W/SCOPE: CPT

## 2019-11-27 PROCEDURE — 80048 BASIC METABOLIC PNL TOTAL CA: CPT

## 2019-11-27 PROCEDURE — 36415 COLL VENOUS BLD VENIPUNCTURE: CPT

## 2019-11-27 PROCEDURE — 93005 ELECTROCARDIOGRAM TRACING: CPT

## 2019-11-27 PROCEDURE — 85610 PROTHROMBIN TIME: CPT

## 2019-11-27 PROCEDURE — 83036 HEMOGLOBIN GLYCOSYLATED A1C: CPT

## 2019-11-27 PROCEDURE — 86900 BLOOD TYPING SEROLOGIC ABO: CPT

## 2019-11-27 RX ORDER — ACETAMINOPHEN 500 MG
1000 TABLET ORAL
COMMUNITY
End: 2021-10-12

## 2019-11-27 RX ORDER — SUMATRIPTAN 100 MG/1
100 TABLET, FILM COATED ORAL
COMMUNITY

## 2019-11-27 RX ORDER — CHOLECALCIFEROL (VITAMIN D3) 125 MCG
CAPSULE ORAL DAILY
COMMUNITY

## 2019-11-27 NOTE — PERIOP NOTES
PATIENT GIVEN SURGICAL SITE INFECTION FAQ HANDOUT AND HAND WASHING TIP SHEET. PREOP INSTRUCTIONS REVIEWED AND PATIENT VERBALIZES UNDERSTANDING OF INSTRUCTIONS. PATIENT HAS BEEN GIVEN THE OPPORTUNITY TO ASK ADDITIONAL QUESTIONS. PREOPERATIVE INSTRUCTIONS REVIEWED WITH PATIENT. PATIENT GIVEN 2 BOTTLES OF CHG CLEANSER AND  INSTRUCTIONS . PATIENT GIVEN SSI INFECTION FAQ SHEET, INFORMATION SHEET ON  HAND WASHING TIPS SHEETS. MRSA/MSSA TREATMENT INSTRUCTION SHEET GIVEN WITH EXPLANATION TO PATIENT THAT THEY WILL BE NOTIFIED IF TREATMENT INSTRUCTIONS NEED TO BE INITIATED. PATIENT WAS GIVEN THE OPPORTUNITY TO ASK QUESTIONS ON THE INFORMATION PROVIDED. PATIENTS /93 , TOOK BP TWICE, PATIENT HAS WHITE COAT SYNDROME AND PATIENT SAYS SHE JUST SAW HER PCP 2 WEEKS AGO AND BP WAS GOOD, PATIENT SAYS PCP HAD TO ADJUST HER BP MEDS AT THAT TIME. ASKED PATIENT TO CHECK HER BP AT HOME TODAY AND IF STILL HIGH TO CALL HER PCP, PATIENT HAS NO OTHER SYMPTOMS, NO HEADACHE, PATIENT SAYS ITS NORMAL FOR ME TO RUN THIS HIGH IN HOSPITAL OR OFFICE. PATIENT AGREEABLE. EXPLAINED IMPORTANCE OF KEEPING BP WITHIN NORMAL LIMITS.

## 2019-11-28 LAB
ATRIAL RATE: 55 BPM
BACTERIA SPEC CULT: NORMAL
BACTERIA SPEC CULT: NORMAL
CALCULATED P AXIS, ECG09: 60 DEGREES
CALCULATED R AXIS, ECG10: -5 DEGREES
CALCULATED T AXIS, ECG11: 21 DEGREES
DIAGNOSIS, 93000: NORMAL
P-R INTERVAL, ECG05: 188 MS
Q-T INTERVAL, ECG07: 414 MS
QRS DURATION, ECG06: 82 MS
QTC CALCULATION (BEZET), ECG08: 396 MS
SERVICE CMNT-IMP: NORMAL
VENTRICULAR RATE, ECG03: 55 BPM

## 2019-12-04 PROBLEM — N02.9 IDIOPATHIC HEMATURIA: Status: ACTIVE | Noted: 2019-12-04

## 2019-12-10 ENCOUNTER — ANESTHESIA EVENT (OUTPATIENT)
Dept: SURGERY | Age: 59
DRG: 458 | End: 2019-12-10
Payer: COMMERCIAL

## 2019-12-11 ENCOUNTER — APPOINTMENT (OUTPATIENT)
Dept: GENERAL RADIOLOGY | Age: 59
DRG: 458 | End: 2019-12-11
Attending: ORTHOPAEDIC SURGERY
Payer: COMMERCIAL

## 2019-12-11 ENCOUNTER — ANESTHESIA (OUTPATIENT)
Dept: SURGERY | Age: 59
DRG: 458 | End: 2019-12-11
Payer: COMMERCIAL

## 2019-12-11 ENCOUNTER — HOSPITAL ENCOUNTER (INPATIENT)
Age: 59
LOS: 3 days | Discharge: HOME | DRG: 458 | End: 2019-12-14
Attending: ORTHOPAEDIC SURGERY | Admitting: ORTHOPAEDIC SURGERY
Payer: COMMERCIAL

## 2019-12-11 DIAGNOSIS — M48.061 SPINAL STENOSIS OF LUMBAR REGION, UNSPECIFIED WHETHER NEUROGENIC CLAUDICATION PRESENT: Primary | ICD-10-CM

## 2019-12-11 LAB
GLUCOSE BLD STRIP.AUTO-MCNC: 71 MG/DL (ref 65–100)
SERVICE CMNT-IMP: NORMAL

## 2019-12-11 PROCEDURE — 82962 GLUCOSE BLOOD TEST: CPT

## 2019-12-11 PROCEDURE — 76210000016 HC OR PH I REC 1 TO 1.5 HR: Performed by: ORTHOPAEDIC SURGERY

## 2019-12-11 PROCEDURE — 74011250636 HC RX REV CODE- 250/636: Performed by: PHYSICIAN ASSISTANT

## 2019-12-11 PROCEDURE — 77030026438 HC STYL ET INTUB CARD -A: Performed by: NURSE ANESTHETIST, CERTIFIED REGISTERED

## 2019-12-11 PROCEDURE — 74011000250 HC RX REV CODE- 250: Performed by: NURSE ANESTHETIST, CERTIFIED REGISTERED

## 2019-12-11 PROCEDURE — 77030014007 HC SPNG HEMSTAT J&J -B: Performed by: ORTHOPAEDIC SURGERY

## 2019-12-11 PROCEDURE — C1713 ANCHOR/SCREW BN/BN,TIS/BN: HCPCS | Performed by: ORTHOPAEDIC SURGERY

## 2019-12-11 PROCEDURE — 77030008684 HC TU ET CUF COVD -B: Performed by: NURSE ANESTHETIST, CERTIFIED REGISTERED

## 2019-12-11 PROCEDURE — 77030034475 HC MISC IMPL SPN: Performed by: ORTHOPAEDIC SURGERY

## 2019-12-11 PROCEDURE — 74011250636 HC RX REV CODE- 250/636: Performed by: ORTHOPAEDIC SURGERY

## 2019-12-11 PROCEDURE — 77030031139 HC SUT VCRL2 J&J -A: Performed by: ORTHOPAEDIC SURGERY

## 2019-12-11 PROCEDURE — 74011000250 HC RX REV CODE- 250

## 2019-12-11 PROCEDURE — 77030004391 HC BUR FLUT MEDT -C: Performed by: ORTHOPAEDIC SURGERY

## 2019-12-11 PROCEDURE — 77030036946 HC GRFT BN FBR CORT 3DEMIN 10CC BACT -G: Performed by: ORTHOPAEDIC SURGERY

## 2019-12-11 PROCEDURE — 74011250636 HC RX REV CODE- 250/636: Performed by: NURSE ANESTHETIST, CERTIFIED REGISTERED

## 2019-12-11 PROCEDURE — 77030039267 HC ADH SKN EXOFIN S2SG -B: Performed by: ORTHOPAEDIC SURGERY

## 2019-12-11 PROCEDURE — 74011250636 HC RX REV CODE- 250/636: Performed by: ANESTHESIOLOGY

## 2019-12-11 PROCEDURE — 77030040922 HC BLNKT HYPOTHRM STRY -A

## 2019-12-11 PROCEDURE — 76060000039 HC ANESTHESIA 4 TO 4.5 HR: Performed by: ORTHOPAEDIC SURGERY

## 2019-12-11 PROCEDURE — 77030040361 HC SLV COMPR DVT MDII -B: Performed by: ORTHOPAEDIC SURGERY

## 2019-12-11 PROCEDURE — 77030037728 HC GRFT BN FBR CORT 3DEMIN 30CC BACT -I: Performed by: ORTHOPAEDIC SURGERY

## 2019-12-11 PROCEDURE — 77030038600 HC TU BPLR IRR DISP STRY -B: Performed by: ORTHOPAEDIC SURGERY

## 2019-12-11 PROCEDURE — 77030038552 HC DRN WND MDII -A: Performed by: ORTHOPAEDIC SURGERY

## 2019-12-11 PROCEDURE — 77030014647 HC SEAL FBRN TISSL BAXT -D: Performed by: ORTHOPAEDIC SURGERY

## 2019-12-11 PROCEDURE — 72020 X-RAY EXAM OF SPINE 1 VIEW: CPT

## 2019-12-11 PROCEDURE — 65270000032 HC RM SEMIPRIVATE

## 2019-12-11 PROCEDURE — 77030005513 HC CATH URETH FOL11 MDII -B: Performed by: ORTHOPAEDIC SURGERY

## 2019-12-11 PROCEDURE — 77030029099 HC BN WAX SSPC -A: Performed by: ORTHOPAEDIC SURGERY

## 2019-12-11 PROCEDURE — 76010000175 HC OR TIME 4 TO 4.5 HR INTENSV-TIER 1: Performed by: ORTHOPAEDIC SURGERY

## 2019-12-11 PROCEDURE — 74011000258 HC RX REV CODE- 258: Performed by: NURSE ANESTHETIST, CERTIFIED REGISTERED

## 2019-12-11 PROCEDURE — 77030018836 HC SOL IRR NACL ICUM -A: Performed by: ORTHOPAEDIC SURGERY

## 2019-12-11 DEVICE — IMPLANTABLE DEVICE: Type: IMPLANTABLE DEVICE | Site: SPINE LUMBAR | Status: FUNCTIONAL

## 2019-12-11 DEVICE — GRAFT BNE 3D 30 CC CORTICAL FIBER: Type: IMPLANTABLE DEVICE | Site: SPINE LUMBAR | Status: FUNCTIONAL

## 2019-12-11 RX ORDER — SODIUM CHLORIDE, SODIUM LACTATE, POTASSIUM CHLORIDE, CALCIUM CHLORIDE 600; 310; 30; 20 MG/100ML; MG/100ML; MG/100ML; MG/100ML
50 INJECTION, SOLUTION INTRAVENOUS CONTINUOUS
Status: DISCONTINUED | OUTPATIENT
Start: 2019-12-11 | End: 2019-12-11 | Stop reason: HOSPADM

## 2019-12-11 RX ORDER — ACETAMINOPHEN 10 MG/ML
INJECTION, SOLUTION INTRAVENOUS AS NEEDED
Status: DISCONTINUED | OUTPATIENT
Start: 2019-12-11 | End: 2019-12-11 | Stop reason: HOSPADM

## 2019-12-11 RX ORDER — LISINOPRIL 20 MG/1
20 TABLET ORAL DAILY
Status: DISCONTINUED | OUTPATIENT
Start: 2019-12-12 | End: 2019-12-14 | Stop reason: HOSPADM

## 2019-12-11 RX ORDER — PHENYLEPHRINE HCL IN 0.9% NACL 0.4MG/10ML
SYRINGE (ML) INTRAVENOUS AS NEEDED
Status: DISCONTINUED | OUTPATIENT
Start: 2019-12-11 | End: 2019-12-11 | Stop reason: HOSPADM

## 2019-12-11 RX ORDER — CEFAZOLIN SODIUM/WATER 2 G/20 ML
2 SYRINGE (ML) INTRAVENOUS ONCE
Status: COMPLETED | OUTPATIENT
Start: 2019-12-11 | End: 2019-12-11

## 2019-12-11 RX ORDER — AMOXICILLIN 500 MG/1
CAPSULE ORAL
Refills: 0 | COMMUNITY
Start: 2019-12-06 | End: 2021-10-12

## 2019-12-11 RX ORDER — CEFAZOLIN SODIUM/WATER 2 G/20 ML
2 SYRINGE (ML) INTRAVENOUS EVERY 8 HOURS
Status: COMPLETED | OUTPATIENT
Start: 2019-12-12 | End: 2019-12-12

## 2019-12-11 RX ORDER — PROPRANOLOL HYDROCHLORIDE 80 MG/1
80 CAPSULE, EXTENDED RELEASE ORAL DAILY
Status: DISCONTINUED | OUTPATIENT
Start: 2019-12-12 | End: 2019-12-14 | Stop reason: HOSPADM

## 2019-12-11 RX ORDER — SODIUM CHLORIDE 0.9 % (FLUSH) 0.9 %
5-40 SYRINGE (ML) INJECTION EVERY 8 HOURS
Status: DISCONTINUED | OUTPATIENT
Start: 2019-12-11 | End: 2019-12-14 | Stop reason: HOSPADM

## 2019-12-11 RX ORDER — ONDANSETRON 2 MG/ML
4 INJECTION INTRAMUSCULAR; INTRAVENOUS AS NEEDED
Status: DISCONTINUED | OUTPATIENT
Start: 2019-12-11 | End: 2019-12-11 | Stop reason: HOSPADM

## 2019-12-11 RX ORDER — DIPHENHYDRAMINE HYDROCHLORIDE 50 MG/ML
12.5 INJECTION, SOLUTION INTRAMUSCULAR; INTRAVENOUS
Status: ACTIVE | OUTPATIENT
Start: 2019-12-11 | End: 2019-12-12

## 2019-12-11 RX ORDER — SODIUM CHLORIDE 0.9 % (FLUSH) 0.9 %
5-40 SYRINGE (ML) INJECTION AS NEEDED
Status: DISCONTINUED | OUTPATIENT
Start: 2019-12-11 | End: 2019-12-11 | Stop reason: HOSPADM

## 2019-12-11 RX ORDER — FENTANYL CITRATE 50 UG/ML
25 INJECTION, SOLUTION INTRAMUSCULAR; INTRAVENOUS
Status: DISCONTINUED | OUTPATIENT
Start: 2019-12-11 | End: 2019-12-11 | Stop reason: HOSPADM

## 2019-12-11 RX ORDER — FENTANYL CITRATE 50 UG/ML
INJECTION, SOLUTION INTRAMUSCULAR; INTRAVENOUS AS NEEDED
Status: DISCONTINUED | OUTPATIENT
Start: 2019-12-11 | End: 2019-12-11 | Stop reason: HOSPADM

## 2019-12-11 RX ORDER — POLYETHYLENE GLYCOL 3350 17 G/17G
17 POWDER, FOR SOLUTION ORAL DAILY
Status: DISCONTINUED | OUTPATIENT
Start: 2019-12-12 | End: 2019-12-13

## 2019-12-11 RX ORDER — DEXAMETHASONE SODIUM PHOSPHATE 4 MG/ML
INJECTION, SOLUTION INTRA-ARTICULAR; INTRALESIONAL; INTRAMUSCULAR; INTRAVENOUS; SOFT TISSUE AS NEEDED
Status: DISCONTINUED | OUTPATIENT
Start: 2019-12-11 | End: 2019-12-11 | Stop reason: HOSPADM

## 2019-12-11 RX ORDER — ONDANSETRON 2 MG/ML
4 INJECTION INTRAMUSCULAR; INTRAVENOUS
Status: DISCONTINUED | OUTPATIENT
Start: 2019-12-11 | End: 2019-12-14 | Stop reason: HOSPADM

## 2019-12-11 RX ORDER — SUCCINYLCHOLINE CHLORIDE 20 MG/ML
INJECTION INTRAMUSCULAR; INTRAVENOUS AS NEEDED
Status: DISCONTINUED | OUTPATIENT
Start: 2019-12-11 | End: 2019-12-11 | Stop reason: HOSPADM

## 2019-12-11 RX ORDER — SODIUM CHLORIDE 0.9 % (FLUSH) 0.9 %
5-40 SYRINGE (ML) INJECTION EVERY 8 HOURS
Status: DISCONTINUED | OUTPATIENT
Start: 2019-12-11 | End: 2019-12-11 | Stop reason: HOSPADM

## 2019-12-11 RX ORDER — ACETAMINOPHEN 325 MG/1
650 TABLET ORAL
Status: DISCONTINUED | OUTPATIENT
Start: 2019-12-11 | End: 2019-12-14 | Stop reason: HOSPADM

## 2019-12-11 RX ORDER — LIDOCAINE HYDROCHLORIDE 10 MG/ML
0.1 INJECTION, SOLUTION EPIDURAL; INFILTRATION; INTRACAUDAL; PERINEURAL AS NEEDED
Status: DISCONTINUED | OUTPATIENT
Start: 2019-12-11 | End: 2019-12-11 | Stop reason: HOSPADM

## 2019-12-11 RX ORDER — DIAZEPAM 5 MG/1
5 TABLET ORAL
Status: DISCONTINUED | OUTPATIENT
Start: 2019-12-11 | End: 2019-12-14 | Stop reason: HOSPADM

## 2019-12-11 RX ORDER — MORPHINE SULFATE 10 MG/ML
2 INJECTION, SOLUTION INTRAMUSCULAR; INTRAVENOUS
Status: DISCONTINUED | OUTPATIENT
Start: 2019-12-11 | End: 2019-12-11 | Stop reason: HOSPADM

## 2019-12-11 RX ORDER — AMOXICILLIN 250 MG
1 CAPSULE ORAL 2 TIMES DAILY
Status: DISCONTINUED | OUTPATIENT
Start: 2019-12-12 | End: 2019-12-14 | Stop reason: HOSPADM

## 2019-12-11 RX ORDER — KETAMINE HYDROCHLORIDE 10 MG/ML
INJECTION, SOLUTION INTRAMUSCULAR; INTRAVENOUS AS NEEDED
Status: DISCONTINUED | OUTPATIENT
Start: 2019-12-11 | End: 2019-12-11 | Stop reason: HOSPADM

## 2019-12-11 RX ORDER — HYDROMORPHONE HYDROCHLORIDE 2 MG/ML
INJECTION, SOLUTION INTRAMUSCULAR; INTRAVENOUS; SUBCUTANEOUS AS NEEDED
Status: DISCONTINUED | OUTPATIENT
Start: 2019-12-11 | End: 2019-12-11 | Stop reason: HOSPADM

## 2019-12-11 RX ORDER — MIDAZOLAM HYDROCHLORIDE 1 MG/ML
INJECTION, SOLUTION INTRAMUSCULAR; INTRAVENOUS AS NEEDED
Status: DISCONTINUED | OUTPATIENT
Start: 2019-12-11 | End: 2019-12-11 | Stop reason: HOSPADM

## 2019-12-11 RX ORDER — AMLODIPINE BESYLATE 5 MG/1
5 TABLET ORAL DAILY
Status: DISCONTINUED | OUTPATIENT
Start: 2019-12-12 | End: 2019-12-14 | Stop reason: HOSPADM

## 2019-12-11 RX ORDER — OXYCODONE HYDROCHLORIDE 5 MG/1
5 TABLET ORAL
Status: DISCONTINUED | OUTPATIENT
Start: 2019-12-11 | End: 2019-12-14 | Stop reason: HOSPADM

## 2019-12-11 RX ORDER — SODIUM CHLORIDE 9 MG/ML
125 INJECTION, SOLUTION INTRAVENOUS CONTINUOUS
Status: DISPENSED | OUTPATIENT
Start: 2019-12-11 | End: 2019-12-12

## 2019-12-11 RX ORDER — FACIAL-BODY WIPES
10 EACH TOPICAL DAILY PRN
Status: DISCONTINUED | OUTPATIENT
Start: 2019-12-13 | End: 2019-12-14 | Stop reason: HOSPADM

## 2019-12-11 RX ORDER — PROPOFOL 10 MG/ML
INJECTION, EMULSION INTRAVENOUS AS NEEDED
Status: DISCONTINUED | OUTPATIENT
Start: 2019-12-11 | End: 2019-12-11 | Stop reason: HOSPADM

## 2019-12-11 RX ORDER — SODIUM CHLORIDE 0.9 % (FLUSH) 0.9 %
5-40 SYRINGE (ML) INJECTION AS NEEDED
Status: DISCONTINUED | OUTPATIENT
Start: 2019-12-11 | End: 2019-12-14 | Stop reason: HOSPADM

## 2019-12-11 RX ORDER — HYDROMORPHONE HCL/0.9% NACL/PF 0.5 MG/ML
PLASTIC BAG, INJECTION (ML) INTRAVENOUS CONTINUOUS
Status: DISCONTINUED | OUTPATIENT
Start: 2019-12-11 | End: 2019-12-12

## 2019-12-11 RX ORDER — NALOXONE HYDROCHLORIDE 0.4 MG/ML
0.4 INJECTION, SOLUTION INTRAMUSCULAR; INTRAVENOUS; SUBCUTANEOUS AS NEEDED
Status: DISCONTINUED | OUTPATIENT
Start: 2019-12-11 | End: 2019-12-14 | Stop reason: HOSPADM

## 2019-12-11 RX ORDER — ONDANSETRON 2 MG/ML
INJECTION INTRAMUSCULAR; INTRAVENOUS AS NEEDED
Status: DISCONTINUED | OUTPATIENT
Start: 2019-12-11 | End: 2019-12-11 | Stop reason: HOSPADM

## 2019-12-11 RX ORDER — HYDROMORPHONE HCL/0.9% NACL/PF 0.5 MG/ML
PLASTIC BAG, INJECTION (ML) INTRAVENOUS
Status: COMPLETED
Start: 2019-12-11 | End: 2019-12-11

## 2019-12-11 RX ORDER — SODIUM CHLORIDE, SODIUM LACTATE, POTASSIUM CHLORIDE, CALCIUM CHLORIDE 600; 310; 30; 20 MG/100ML; MG/100ML; MG/100ML; MG/100ML
INJECTION, SOLUTION INTRAVENOUS
Status: DISCONTINUED | OUTPATIENT
Start: 2019-12-11 | End: 2019-12-11 | Stop reason: HOSPADM

## 2019-12-11 RX ORDER — HYDROMORPHONE HYDROCHLORIDE 1 MG/ML
0.2 INJECTION, SOLUTION INTRAMUSCULAR; INTRAVENOUS; SUBCUTANEOUS
Status: DISCONTINUED | OUTPATIENT
Start: 2019-12-11 | End: 2019-12-11 | Stop reason: HOSPADM

## 2019-12-11 RX ORDER — ROCURONIUM BROMIDE 10 MG/ML
INJECTION, SOLUTION INTRAVENOUS AS NEEDED
Status: DISCONTINUED | OUTPATIENT
Start: 2019-12-11 | End: 2019-12-11 | Stop reason: HOSPADM

## 2019-12-11 RX ORDER — MELATONIN
2000 DAILY
Status: DISCONTINUED | OUTPATIENT
Start: 2019-12-12 | End: 2019-12-14 | Stop reason: HOSPADM

## 2019-12-11 RX ORDER — GLYCOPYRROLATE 0.2 MG/ML
INJECTION INTRAMUSCULAR; INTRAVENOUS AS NEEDED
Status: DISCONTINUED | OUTPATIENT
Start: 2019-12-11 | End: 2019-12-11 | Stop reason: HOSPADM

## 2019-12-11 RX ORDER — NEOSTIGMINE METHYLSULFATE 1 MG/ML
INJECTION INTRAVENOUS AS NEEDED
Status: DISCONTINUED | OUTPATIENT
Start: 2019-12-11 | End: 2019-12-11 | Stop reason: HOSPADM

## 2019-12-11 RX ORDER — DIAZEPAM 10 MG/2ML
5 INJECTION INTRAMUSCULAR
Status: DISCONTINUED | OUTPATIENT
Start: 2019-12-11 | End: 2019-12-11 | Stop reason: HOSPADM

## 2019-12-11 RX ORDER — SUMATRIPTAN 25 MG/1
100 TABLET, FILM COATED ORAL
Status: ACTIVE | OUTPATIENT
Start: 2019-12-11 | End: 2019-12-12

## 2019-12-11 RX ORDER — EPHEDRINE SULFATE/0.9% NACL/PF 50 MG/5 ML
SYRINGE (ML) INTRAVENOUS AS NEEDED
Status: DISCONTINUED | OUTPATIENT
Start: 2019-12-11 | End: 2019-12-11 | Stop reason: HOSPADM

## 2019-12-11 RX ORDER — ATORVASTATIN CALCIUM 10 MG/1
10 TABLET, FILM COATED ORAL DAILY
Status: DISCONTINUED | OUTPATIENT
Start: 2019-12-12 | End: 2019-12-14 | Stop reason: HOSPADM

## 2019-12-11 RX ORDER — OXYCODONE HYDROCHLORIDE 5 MG/1
10 TABLET ORAL
Status: DISCONTINUED | OUTPATIENT
Start: 2019-12-11 | End: 2019-12-14 | Stop reason: HOSPADM

## 2019-12-11 RX ORDER — LIDOCAINE HYDROCHLORIDE 20 MG/ML
INJECTION, SOLUTION EPIDURAL; INFILTRATION; INTRACAUDAL; PERINEURAL AS NEEDED
Status: DISCONTINUED | OUTPATIENT
Start: 2019-12-11 | End: 2019-12-11 | Stop reason: HOSPADM

## 2019-12-11 RX ADMIN — FENTANYL CITRATE 100 MCG: 50 INJECTION, SOLUTION INTRAMUSCULAR; INTRAVENOUS at 16:55

## 2019-12-11 RX ADMIN — ONDANSETRON HYDROCHLORIDE 4 MG: 2 INJECTION, SOLUTION INTRAMUSCULAR; INTRAVENOUS at 19:56

## 2019-12-11 RX ADMIN — SODIUM CHLORIDE, POTASSIUM CHLORIDE, SODIUM LACTATE AND CALCIUM CHLORIDE: 600; 310; 30; 20 INJECTION, SOLUTION INTRAVENOUS at 16:46

## 2019-12-11 RX ADMIN — Medication 80 MCG: at 18:38

## 2019-12-11 RX ADMIN — Medication 80 MCG: at 19:09

## 2019-12-11 RX ADMIN — PROPOFOL 150 MG: 10 INJECTION, EMULSION INTRAVENOUS at 16:55

## 2019-12-11 RX ADMIN — DEXMEDETOMIDINE HYDROCHLORIDE 4 MCG: 100 INJECTION, SOLUTION, CONCENTRATE INTRAVENOUS at 16:46

## 2019-12-11 RX ADMIN — SODIUM CHLORIDE, SODIUM LACTATE, POTASSIUM CHLORIDE, AND CALCIUM CHLORIDE 50 ML/HR: 600; 310; 30; 20 INJECTION, SOLUTION INTRAVENOUS at 15:03

## 2019-12-11 RX ADMIN — HYDROMORPHONE HYDROCHLORIDE 0.4 MG: 2 INJECTION, SOLUTION INTRAMUSCULAR; INTRAVENOUS; SUBCUTANEOUS at 20:08

## 2019-12-11 RX ADMIN — Medication: at 21:33

## 2019-12-11 RX ADMIN — GLYCOPYRROLATE 0.2 MG: 0.2 INJECTION, SOLUTION INTRAMUSCULAR; INTRAVENOUS at 17:12

## 2019-12-11 RX ADMIN — LIDOCAINE HYDROCHLORIDE 100 MG: 20 INJECTION, SOLUTION EPIDURAL; INFILTRATION; INTRACAUDAL; PERINEURAL at 16:55

## 2019-12-11 RX ADMIN — Medication 2 G: at 17:07

## 2019-12-11 RX ADMIN — DEXMEDETOMIDINE HYDROCHLORIDE 4 MCG: 100 INJECTION, SOLUTION, CONCENTRATE INTRAVENOUS at 16:52

## 2019-12-11 RX ADMIN — SODIUM CHLORIDE 125 ML/HR: 900 INJECTION, SOLUTION INTRAVENOUS at 21:29

## 2019-12-11 RX ADMIN — SUCCINYLCHOLINE CHLORIDE 120 MG: 20 INJECTION, SOLUTION INTRAMUSCULAR; INTRAVENOUS at 16:55

## 2019-12-11 RX ADMIN — HYDROMORPHONE HYDROCHLORIDE 0.4 MG: 2 INJECTION, SOLUTION INTRAMUSCULAR; INTRAVENOUS; SUBCUTANEOUS at 20:52

## 2019-12-11 RX ADMIN — HYDROMORPHONE HYDROCHLORIDE 0.4 MG: 2 INJECTION, SOLUTION INTRAMUSCULAR; INTRAVENOUS; SUBCUTANEOUS at 20:55

## 2019-12-11 RX ADMIN — KETAMINE HYDROCHLORIDE 20 MG: 10 INJECTION, SOLUTION INTRAMUSCULAR; INTRAVENOUS at 18:45

## 2019-12-11 RX ADMIN — HYDROMORPHONE HYDROCHLORIDE 0.6 MG: 2 INJECTION, SOLUTION INTRAMUSCULAR; INTRAVENOUS; SUBCUTANEOUS at 21:00

## 2019-12-11 RX ADMIN — HYDROMORPHONE HYDROCHLORIDE 0.2 MG: 2 INJECTION, SOLUTION INTRAMUSCULAR; INTRAVENOUS; SUBCUTANEOUS at 18:35

## 2019-12-11 RX ADMIN — DEXMEDETOMIDINE HYDROCHLORIDE 4 MCG: 100 INJECTION, SOLUTION, CONCENTRATE INTRAVENOUS at 17:01

## 2019-12-11 RX ADMIN — Medication 120 MCG: at 16:58

## 2019-12-11 RX ADMIN — PROPOFOL 20 MG: 10 INJECTION, EMULSION INTRAVENOUS at 20:08

## 2019-12-11 RX ADMIN — Medication 80 MCG: at 18:41

## 2019-12-11 RX ADMIN — KETAMINE HYDROCHLORIDE 30 MG: 10 INJECTION, SOLUTION INTRAMUSCULAR; INTRAVENOUS at 16:55

## 2019-12-11 RX ADMIN — Medication 10 MG: at 19:23

## 2019-12-11 RX ADMIN — DEXAMETHASONE SODIUM PHOSPHATE 8 MG: 4 INJECTION, SOLUTION INTRAMUSCULAR; INTRAVENOUS at 17:03

## 2019-12-11 RX ADMIN — ROCURONIUM BROMIDE 5 MG: 10 SOLUTION INTRAVENOUS at 16:55

## 2019-12-11 RX ADMIN — ROCURONIUM BROMIDE 25 MG: 10 SOLUTION INTRAVENOUS at 17:22

## 2019-12-11 RX ADMIN — FENTANYL CITRATE 25 MCG: 50 INJECTION, SOLUTION INTRAMUSCULAR; INTRAVENOUS at 21:25

## 2019-12-11 RX ADMIN — GLYCOPYRROLATE 0.6 MG: 0.2 INJECTION, SOLUTION INTRAMUSCULAR; INTRAVENOUS at 20:01

## 2019-12-11 RX ADMIN — DEXMEDETOMIDINE HYDROCHLORIDE 4 MCG: 100 INJECTION, SOLUTION, CONCENTRATE INTRAVENOUS at 16:50

## 2019-12-11 RX ADMIN — ACETAMINOPHEN 1000 MG: 10 INJECTION, SOLUTION INTRAVENOUS at 17:08

## 2019-12-11 RX ADMIN — Medication 80 MCG: at 19:07

## 2019-12-11 RX ADMIN — DEXMEDETOMIDINE HYDROCHLORIDE 4 MCG: 100 INJECTION, SOLUTION, CONCENTRATE INTRAVENOUS at 17:09

## 2019-12-11 RX ADMIN — Medication 5 MG: at 20:14

## 2019-12-11 RX ADMIN — NEOSTIGMINE METHYLSULFATE 3 MG: 1 INJECTION, SOLUTION INTRAMUSCULAR; INTRAVENOUS; SUBCUTANEOUS at 20:01

## 2019-12-11 RX ADMIN — PHENYLEPHRINE HYDROCHLORIDE 60 MCG/MIN: 10 INJECTION INTRAVENOUS at 17:10

## 2019-12-11 RX ADMIN — Medication 5 MG: at 19:41

## 2019-12-11 RX ADMIN — MIDAZOLAM 2 MG: 1 INJECTION INTRAMUSCULAR; INTRAVENOUS at 16:46

## 2019-12-11 RX ADMIN — Medication 80 MCG: at 19:00

## 2019-12-11 NOTE — H&P
PRE-OP HISTORY AND PHYSICAL    Subjective:     Patient is a 62 y.o.  female presented with a history of symptomatic right sided lumbar radisulopathy. Onset of symptoms was gradual with gradually worsening course over an extended period of time. The patient's symptoms have failed to improve with conservative management including PT and medications. The indications for the procedure include low back and right leg pain and leg weakness. The patient is scheduled to undergo Lumbar Laminectomy L2-S1 with Posterior Spinal Fusion with Dr Shantanu Palmer on 12/11/2019. The procedure, risks and rehab concerns were discussed with the patient at length, all patient questions were answered and the patient elects to proceed with surgical intervention at this time. Patient Active Problem List    Diagnosis Date Noted    Idiopathic hematuria 12/04/2019     Past Medical History:   Diagnosis Date    Adverse effect of anesthesia     HARD TO WAKE UP AFTER ANESTHESIA X 1    Chronic obstructive pulmonary disease (HCC)     Heart murmur     History of shingles     Hypertension     Idiopathic hematuria 12/4/2019    PUD (peptic ulcer disease)     TIA (transient ischemic attack) 1991    NO DEFICITS    White coat syndrome with diagnosis of hypertension       Past Surgical History:   Procedure Laterality Date    HX CHOLECYSTECTOMY      HX HYSTERECTOMY      HX OTHER SURGICAL      THORACIC FUSIONS X 2       Prior to Admission medications    Medication Sig Start Date End Date Taking? Authorizing Provider   SUMAtriptan (IMITREX) 100 mg tablet Take 100 mg by mouth once as needed for Migraine. Provider, Historical   acetaminophen (ACETAMINOPHEN EXTRA STRENGTH) 500 mg tablet Take 1,000 mg by mouth every six (6) hours as needed for Pain. Provider, Historical   cholecalciferol, vitamin D3, (VITAMIN D3) 2,000 unit tab Take  by mouth. Provider, Historical   OTHER BC - PATIENT INSTRUCTED TO STOP 7 DAYS BEFORE SURGERY. Provider, Historical   amLODIPine (NORVASC) 5 mg tablet Take 5 mg by mouth daily. Indications: high blood pressure    Laura Urbina MD   atorvastatin (LIPITOR) 10 mg tablet Take 10 mg by mouth daily. Indications: high cholesterol    Laura Urbina MD   lisinopril (PRINIVIL, ZESTRIL) 20 mg tablet Take 20 mg by mouth daily. Laura Urbina MD   propranolol LA (INDERAL LA) 80 mg SR capsule Take 80 mg by mouth daily. Laura Urbina MD   cyclobenzaprine (FLEXERIL) 10 mg tablet Take 0.5 Tabs by mouth three (3) times daily as needed for Muscle Spasm(s). 7/29/19   CARLITA Hayden     Allergies   Allergen Reactions    Shellfish Derived Angioedema     CAN EAT CLAMS, SHRIMP AND OYSTERS, BUT NO CRAB NO LOBSTER     Iodine Swelling    Codeine Nausea Only      Social History     Tobacco Use    Smoking status: Current Every Day Smoker     Packs/day: 0.25     Years: 30.00     Pack years: 7.50    Smokeless tobacco: Never Used   Substance Use Topics    Alcohol use: Yes     Comment: SOCIAL       Family History   Problem Relation Age of Onset    Heart Disease Mother     Asthma Mother     Hypertension Mother     Cancer Father         STOMACH CANCER    Diabetes Sister     High Cholesterol Sister     Obesity Sister     Hypertension Sister     Diabetes Sister     Anemia Sister     Arthritis-osteo Sister     Anesth Problems Neg Hx       Review of Systems  A comprehensive review of systems was negative except for that written in the HPI. Objective:     No data found. There were no vitals taken for this visit. General:  Alert, cooperative, no distress, appears stated age. Head:  Normocephalic, without obvious abnormality, atraumatic. Eyes:  Conjunctivae/corneas clear. PERRL, EOMs intact. Fundi benign. Ears:  Normal TMs and external ear canals both ears. Nose: Nares normal. Septum midline. Mucosa normal. No drainage or sinus tenderness.    Throat: Lips, mucosa, and tongue normal. Teeth and gums normal.   Neck: Supple, symmetrical, trachea midline, no adenopathy, thyroid: no enlargement/tenderness/nodules, no carotid bruit and no JVD. Back:   + Scoliosis. ROM normal. No CVA tenderness. Lungs:   Clear to auscultation bilaterally. Chest wall:  No tenderness or deformity. Heart:  Regular rate and rhythm, S1, S2 normal, no murmur, click, rub or gallop. Abdomen:   Soft, non-tender. Bowel sounds normal. No masses,  No organomegaly. Extremities: Extremities normal, atraumatic, no cyanosis or edema. Weakness is noted in the right leg in an L5 distribution. Weak right EHL and dorsiflexion. Pulses: 2+ and symmetric all extremities. Skin: Skin color, texture, turgor normal. No rashes or lesions. Lymph nodes: Cervical, supraclavicular, and axillary nodes normal.   Neurologic: CNII-XII intact. Normal strength, sensation and reflexes throughout. Results for Amarilys Edwards (MRN [id number]) as of 12/11/2019 08:37   Ref. Range 11/27/2019 09:22   WBC Latest Ref Range: 3.6 - 11.0 K/uL 6.5   NRBC Latest Ref Range: 0  WBC 0.0   RBC Latest Ref Range: 3.80 - 5.20 M/uL 4.39   HGB Latest Ref Range: 11.5 - 16.0 g/dL 12.0   HCT Latest Ref Range: 35.0 - 47.0 % 37.5   MCV Latest Ref Range: 80.0 - 99.0 FL 85.4   MCH Latest Ref Range: 26.0 - 34.0 PG 27.3   MCHC Latest Ref Range: 30.0 - 36.5 g/dL 32.0   RDW Latest Ref Range: 11.5 - 14.5 % 15.4 (H)   PLATELET Latest Ref Range: 150 - 400 K/uL 280   MPV Latest Ref Range: 8.9 - 12.9 FL 10.5   ABSOLUTE NRBC Latest Ref Range: 0.00 - 0.01 K/uL 0.00       Results for Amarilys Edwards (MRN [id number]) as of 12/11/2019 08:37   Ref.  Range 11/27/2019 09:22   Sodium Latest Ref Range: 136 - 145 mmol/L 142   Potassium Latest Ref Range: 3.5 - 5.1 mmol/L 3.5   Chloride Latest Ref Range: 97 - 108 mmol/L 108   CO2 Latest Ref Range: 21 - 32 mmol/L 29   Anion gap Latest Ref Range: 5 - 15 mmol/L 5   Glucose Latest Ref Range: 65 - 100 mg/dL 80   BUN Latest Ref Range: 6 - 20 MG/DL 9 Creatinine Latest Ref Range: 0.55 - 1.02 MG/DL 0.74   BUN/Creatinine ratio Latest Ref Range: 12 - 20   12   Calcium Latest Ref Range: 8.5 - 10.1 MG/DL 9.2   GFR est non-AA Latest Ref Range: >60 ml/min/1.73m2 >60   GFR est AA Latest Ref Range: >60 ml/min/1.73m2 >60   Hemoglobin A1c, (calculated) Latest Ref Range: 4.0 - 5.6 % 5.1   Est. average glucose Latest Units: mg/dL 100       Results for Magali Whitley (MRN [id number]) as of 12/11/2019 08:37   Ref. Range 11/27/2019 09:22   INR Latest Ref Range: 0.9 - 1.1   1.0   Prothrombin time Latest Ref Range: 9.0 - 11.1 sec 10.6       Results for Magali Whitley (MRN [id number]) as of 12/11/2019 08:37   Ref.  Range 11/27/2019 09:22   Color Latest Units:   YELLOW/STRAW   Appearance Latest Ref Range: CLEAR   CLEAR   Specific gravity Latest Ref Range: 1.003 - 1.030   1.014   pH (UA) Latest Ref Range: 5.0 - 8.0   6.0   Protein Latest Ref Range: NEG mg/dL 30 (A)   Glucose Latest Ref Range: NEG mg/dL NEGATIVE   Ketone Latest Ref Range: NEG mg/dL NEGATIVE   Blood Latest Ref Range: NEG   MODERATE (A)   Bilirubin Latest Ref Range: NEG   NEGATIVE   Urobilinogen Latest Ref Range: 0.2 - 1.0 EU/dL 1.0   Nitrites Latest Ref Range: NEG   NEGATIVE   Leukocyte Esterase Latest Ref Range: NEG   NEGATIVE   Epithelial cells Latest Ref Range: FEW /lpf FEW   WBC Latest Ref Range: 0 - 4 /hpf 0-4   RBC Latest Ref Range: 0 - 5 /hpf 20-50   Bacteria Latest Ref Range: NEG /hpf NEGATIVE   Hyaline cast Latest Ref Range: 0 - 5 /lpf 0-2       CULTURE, MRSA [[id number]] (Order [id number])   Microbiology   Date: 11/27/2019 Department: Mell Engel Or Preadmit Tsting Released By: Los Bruner RN (auto-released) Authorizing: George Jeff MD   Specimen Information: Nares        Component Value Flag Ref Range Units Status   Special Requests:      Final   NO SPECIAL REQUESTS    Culture result: MRSA NOT PRESENT      Final   Culture result:      Final       Screening of patient nares for MRSA is for surveillance purposes and, if positive, to facilitate isolation considerations in high risk settings. It is not intended for automatic decolonization interventions per se as regimens are not sufficiently effective to warrant routine use. Results for Tony Pretty (MRN [id number]) as of 12/11/2019 08:37   Ref.  Range 11/27/2019 09:22   Color Latest Units:   YELLOW/STRAW   Appearance Latest Ref Range: CLEAR   CLEAR   Specific gravity Latest Ref Range: 1.003 - 1.030   1.014   pH (UA) Latest Ref Range: 5.0 - 8.0   6.0   Protein Latest Ref Range: NEG mg/dL 30 (A)   Glucose Latest Ref Range: NEG mg/dL NEGATIVE   Ketone Latest Ref Range: NEG mg/dL NEGATIVE   Blood Latest Ref Range: NEG   MODERATE (A)   Bilirubin Latest Ref Range: NEG   NEGATIVE   Urobilinogen Latest Ref Range: 0.2 - 1.0 EU/dL 1.0   Nitrites Latest Ref Range: NEG   NEGATIVE   Leukocyte Esterase Latest Ref Range: NEG   NEGATIVE   Epithelial cells Latest Ref Range: FEW /lpf FEW   WBC Latest Ref Range: 0 - 4 /hpf 0-4   RBC Latest Ref Range: 0 - 5 /hpf 20-50   Bacteria Latest Ref Range: NEG /hpf NEGATIVE   Hyaline cast Latest Ref Range: 0 - 5 /lpf 0-2     11/28/2019  2:32 PM - [place], Card Result In     Component Value Ref Range & Units Status   Ventricular Rate 55  BPM Final   Atrial Rate 55  BPM Final   P-R Interval 188  ms Final   QRS Duration 82  ms Final   Q-T Interval 414  ms Final   QTC Calculation (Bezet) 396  ms Final   Calculated P Axis 60  degrees Final   Calculated R Axis -5  degrees Final   Calculated T Axis 21  degrees Final   Diagnosis   Final   Sinus bradycardia   Otherwise normal ECG   No previous ECGs available   Confirmed by Beba Sanders ([id number]) on 11/28/2019 2:32:43 PM      EXAM: MRI LUMB SPINE WO CONT     INDICATION: Low back pain.     COMPARISON: Radiographs 7/29/2019     TECHNIQUE: MR imaging of the lumbar spine was performed using the following  sequences: sagittal T1, T2, STIR;  axial T1, T2.      CONTRAST: None.     FINDINGS:     There is mild dextro convex scoliosis of lumbar spine centered at L4. Vertebral  body heights are maintained. Moderate degenerative edema surrounds L4-5. Mild  degenerative edema surrounds T10-11 and T11-12     The conus medullaris terminates at L1. Signal and caliber of the distal spinal  cord are within normal limits.      Several cysts are seen in the bilateral kidneys.     T10-11: Moderate disc space narrowing. Mild broad-based disc bulge causing mild  spinal stenosis. There is mild left and moderate right neural foraminal  narrowing.     T11-12: Moderate disc space narrowing. Mild broad-based disc bulge causing mild  spinal stenosis. There is severe right and moderate left neural foraminal  narrowing.     T12-L1: Mild disc space narrowing. No significant spinal stenosis. There is mild  left neural foraminal narrowing.     L1-L2: Mild disc space narrowing. Mild broad-based disc bulge thousand faint  spinal stenosis or neural foraminal narrowing.     L2-L3: Mild disc space narrowing. Mild broad-based disc bulge with thickening of  ligamentum flavum causing moderate spinal stenosis. There is mild bilateral  neural foraminal narrowing. Mild bilateral posterior facet arthropathy.     L3-L4: Severe disc space narrowing. Mild broad-based disc bulge that is  asymmetric to the left. There is mild spinal stenosis with greater impingement  on the left lateral recess. There is moderate left and mild right neural  foraminal narrowing.     L4-L5: Moderate disc space narrowing. Moderate broad-based disc bulge with  thickening of ligamentum flavum causing severe spinal stenosis. There is severe  bilateral neural foraminal narrowing. Mild bilateral posterior facet  arthropathy.     L5-S1: Severe disc space narrowing. Mild broad-based disc bulge with minimal  spinal stenosis. There is mild left and severe right neural foraminal narrowing.   Moderate posterior facet arthropathy.     IMPRESSION  IMPRESSION:  Mild extra convex scoliosis with multilevel significant spondylosis as above. Spinal stenosis is worst at L4-5. Assessment:     Lumbar Spinal Stenosis  Lumbar Disc Degeneration  Degenerative Scoliosis  Migraines  Frequent UTI  Osteoporosis  Cholelithiasis  Uterine Fibroids  Hypertension  Chronic Tonsillar Lymphadenopathy  Peptic Ulcers  Renal Cysts  Vitamin D Deficiency  Hypercholesterolemia  Glucose Intolerance        Plan:     The various methods of treatment have been discussed with the patient and family. The procedure, alternatives, potential risks and rehab concerns were discussed with the patient at length, all patient questions were answered and the patient elects to proceed with surgical intervention at this time. Sukhdev Cerda PA-C  [facility].   Office [phone]  [place] [phone]  Cell [phone]

## 2019-12-11 NOTE — ANESTHESIA PREPROCEDURE EVALUATION
Relevant Problems   No relevant active problems       Anesthetic History   No history of anesthetic complications            Review of Systems / Medical History  Patient summary reviewed, nursing notes reviewed and pertinent labs reviewed    Pulmonary    COPD               Neuro/Psych       CVA  TIA     Cardiovascular    Hypertension: well controlled              Exercise tolerance: >4 METS     GI/Hepatic/Renal           PUD     Endo/Other             Other Findings            Physical Exam    Airway  Mallampati: I  TM Distance: > 6 cm  Neck ROM: normal range of motion   Mouth opening: Normal     Cardiovascular    Rhythm: regular  Rate: normal         Dental    Dentition: Full upper dentures     Pulmonary  Breath sounds clear to auscultation               Abdominal         Other Findings            Anesthetic Plan    ASA: 3  Anesthesia type: general          Induction: Intravenous  Anesthetic plan and risks discussed with: Patient

## 2019-12-12 LAB
ANION GAP SERPL CALC-SCNC: 4 MMOL/L (ref 5–15)
BUN SERPL-MCNC: 27 MG/DL (ref 6–20)
BUN/CREAT SERPL: 27 (ref 12–20)
CALCIUM SERPL-MCNC: 8.2 MG/DL (ref 8.5–10.1)
CHLORIDE SERPL-SCNC: 112 MMOL/L (ref 97–108)
CO2 SERPL-SCNC: 26 MMOL/L (ref 21–32)
CREAT SERPL-MCNC: 1 MG/DL (ref 0.55–1.02)
GLUCOSE SERPL-MCNC: 117 MG/DL (ref 65–100)
HGB BLD-MCNC: 9.4 G/DL (ref 11.5–16)
POTASSIUM SERPL-SCNC: 4.2 MMOL/L (ref 3.5–5.1)
SODIUM SERPL-SCNC: 142 MMOL/L (ref 136–145)

## 2019-12-12 PROCEDURE — 0SG10K1 FUSION OF 2 OR MORE LUMBAR VERTEBRAL JOINTS WITH NONAUTOLOGOUS TISSUE SUBSTITUTE, POSTERIOR APPROACH, POSTERIOR COLUMN, OPEN APPROACH: ICD-10-PCS | Performed by: ORTHOPAEDIC SURGERY

## 2019-12-12 PROCEDURE — 0SG30K1 FUSION OF LUMBOSACRAL JOINT WITH NONAUTOLOGOUS TISSUE SUBSTITUTE, POSTERIOR APPROACH, POSTERIOR COLUMN, OPEN APPROACH: ICD-10-PCS | Performed by: ORTHOPAEDIC SURGERY

## 2019-12-12 PROCEDURE — 85018 HEMOGLOBIN: CPT

## 2019-12-12 PROCEDURE — 94760 N-INVAS EAR/PLS OXIMETRY 1: CPT

## 2019-12-12 PROCEDURE — 80048 BASIC METABOLIC PNL TOTAL CA: CPT

## 2019-12-12 PROCEDURE — 97161 PT EVAL LOW COMPLEX 20 MIN: CPT

## 2019-12-12 PROCEDURE — 97116 GAIT TRAINING THERAPY: CPT

## 2019-12-12 PROCEDURE — 01NB0ZZ RELEASE LUMBAR NERVE, OPEN APPROACH: ICD-10-PCS | Performed by: ORTHOPAEDIC SURGERY

## 2019-12-12 PROCEDURE — 74011250637 HC RX REV CODE- 250/637: Performed by: PHYSICIAN ASSISTANT

## 2019-12-12 PROCEDURE — 97165 OT EVAL LOW COMPLEX 30 MIN: CPT

## 2019-12-12 PROCEDURE — 97535 SELF CARE MNGMENT TRAINING: CPT

## 2019-12-12 PROCEDURE — 77010033678 HC OXYGEN DAILY

## 2019-12-12 PROCEDURE — 74011250636 HC RX REV CODE- 250/636: Performed by: PHYSICIAN ASSISTANT

## 2019-12-12 PROCEDURE — 51798 US URINE CAPACITY MEASURE: CPT

## 2019-12-12 PROCEDURE — 36415 COLL VENOUS BLD VENIPUNCTURE: CPT

## 2019-12-12 PROCEDURE — 97530 THERAPEUTIC ACTIVITIES: CPT

## 2019-12-12 PROCEDURE — 65270000032 HC RM SEMIPRIVATE

## 2019-12-12 RX ADMIN — LISINOPRIL 20 MG: 20 TABLET ORAL at 10:10

## 2019-12-12 RX ADMIN — OXYCODONE 10 MG: 5 TABLET ORAL at 10:09

## 2019-12-12 RX ADMIN — ATORVASTATIN CALCIUM 10 MG: 10 TABLET, FILM COATED ORAL at 10:10

## 2019-12-12 RX ADMIN — OXYCODONE 10 MG: 5 TABLET ORAL at 20:35

## 2019-12-12 RX ADMIN — Medication 10 ML: at 00:37

## 2019-12-12 RX ADMIN — OXYCODONE 10 MG: 5 TABLET ORAL at 13:25

## 2019-12-12 RX ADMIN — OXYCODONE 10 MG: 5 TABLET ORAL at 16:25

## 2019-12-12 RX ADMIN — PROPRANOLOL HYDROCHLORIDE 80 MG: 80 CAPSULE, EXTENDED RELEASE ORAL at 10:09

## 2019-12-12 RX ADMIN — Medication 2 G: at 10:09

## 2019-12-12 RX ADMIN — AMLODIPINE BESYLATE 5 MG: 5 TABLET ORAL at 10:10

## 2019-12-12 RX ADMIN — Medication 10 ML: at 06:40

## 2019-12-12 RX ADMIN — POLYETHYLENE GLYCOL 3350 17 G: 17 POWDER, FOR SOLUTION ORAL at 10:09

## 2019-12-12 RX ADMIN — Medication 10 ML: at 21:36

## 2019-12-12 RX ADMIN — SENNOSIDES AND DOCUSATE SODIUM 1 TABLET: 8.6; 5 TABLET ORAL at 18:11

## 2019-12-12 RX ADMIN — MELATONIN 2 TABLET: at 10:09

## 2019-12-12 RX ADMIN — Medication 2 G: at 00:46

## 2019-12-12 RX ADMIN — SENNOSIDES AND DOCUSATE SODIUM 1 TABLET: 8.6; 5 TABLET ORAL at 10:10

## 2019-12-12 NOTE — PROGRESS NOTES
Care Management Interventions  PCP Verified by CM: Yes  Palliative Care Criteria Met (RRAT>21 & CHF Dx)?: No  Mode of Transport at Discharge: Other (see comment)  Transition of Care Consult (CM Consult): Discharge Planning  MyChart Signup: Yes  Discharge Durable Medical Equipment: No  Health Maintenance Reviewed: Yes  Physical Therapy Consult: Yes  Occupational Therapy Consult: Yes  Speech Therapy Consult: No  Current Support Network: Other(Lives with son)  Confirm Follow Up Transport: Family  Plan discussed with Pt/Family/Caregiver: Yes   Resource Information Provided?: No  Discharge Location  Discharge Placement: Home with home health    Reason for Admission:   Lumbar spinal stenosis                   RRAT Score:          0           Plan for utilizing home health:      [facility] choice pending. Current Advanced Directive/Advance Care Plan: On file,Jah Agrawal is POA. Transition of Care Plan:                    Reviewed chart for transitions of care,and discussed in rounds. CM met with patient at bedside to explain role and offer support. Patient is alert and oriented x4, and confirmed demographics. She lives in her private resident home with her son and grandson. Patient is independent with ADLs and IADLS prior to admission, no DME use. CM discussed home health with patent, and she will review choices. Her physical address is [address]. CM to follow.     [facility]

## 2019-12-12 NOTE — ANESTHESIA POSTPROCEDURE EVALUATION
Post-Anesthesia Evaluation and Assessment    Patient: Jada Bryan MRN: [id number]  SSN: [id number]    YOB: 1960  Age: 62 y.o. Sex: female      I have evaluated the patient and they are stable and ready for discharge from the PACU. Cardiovascular Function/Vital Signs  Visit Vitals  /60   Pulse (!) 57   Temp 36.4 °C (97.6 °F)   Resp 18   Wt 78.9 kg (174 lb)   SpO2 100%   BMI 28.51 kg/m²       Patient is status post General anesthesia for Procedure(s):  LUMBAR LAMINECTOMY L2-S1, POSTERIOR SPINAL FUSION L3-S1. Nausea/Vomiting: None    Postoperative hydration reviewed and adequate. Pain:  Pain Scale 1: FLACC (12/11/19 2054)  Pain Intensity 1: 6 (12/11/19 1426)   Managed    Neurological Status:   Neuro (WDL): Exceptions to WDL (12/11/19 2055)  Neuro  Neurologic State: Drowsy (12/11/19 2055)  LUE Motor Response: Purposeful (12/11/19 2055)  LLE Motor Response: Purposeful (12/11/19 2055)  RUE Motor Response: Purposeful (12/11/19 2055)  RLE Motor Response: Purposeful;Numbness(in toes) (12/11/19 2055)   At baseline    Mental Status, Level of Consciousness: Alert and  oriented to person, place, and time    Pulmonary Status:   O2 Device: CO2 nasal cannula (12/11/19 2057)   Adequate oxygenation and airway patent    Complications related to anesthesia: None    Post-anesthesia assessment completed.  No concerns    Signed By: Jovana Ricketts MD     December 11, 2019

## 2019-12-12 NOTE — PROGRESS NOTES
Primary Nurse Merry Rivera RN and Burgess Rivera RN performed a dual skin assessment on this patient No impairment noted  Juarez score is 20

## 2019-12-12 NOTE — PROGRESS NOTES
Problem: Self Care Deficits Care Plan (Adult)  Goal: *Acute Goals and Plan of Care (Insert Text)  Description  FUNCTIONAL STATUS PRIOR TO ADMISSION: Patient was independent and active without use of DME. Patient was independent for basic and instrumental ADLs. HOME SUPPORT: The patient lived with 2 sons but did not require assist. Both work, but pt would only be alone for about 3 hours in the afternoon. Occupational Therapy Goals  Initiated 12/12/2019    1. Patient will perform lower body dressing with supervision/set-up within 7 days. 2.  Patient will perform toilet transfer with modified independence using most appropriate DME within 7 days. 3.  Patient will perform all aspects of toileting at supervision/set-up within 7 days. 4.  Patient will don/doff back brace at minimal assistance within 7 days. 5.  Patient will verbalize/demonstrate 3/3 back precautions during ADL tasks without cues within 7 days. Outcome: Progressing Towards Goal     OCCUPATIONAL THERAPY EVALUATION  Patient: Lexie Delgadillo (55 y.o. female)  Date: 12/12/2019  Primary Diagnosis: Lumbar spinal stenosis [M48.061]  Lumbar stenosis [M48.061]  Procedure(s) (LRB):  LUMBAR LAMINECTOMY L2-S1, POSTERIOR SPINAL FUSION L3-S1 (N/A) 1 Day Post-Op   Precautions: Fall; Back, stand erect, log roll, sitting 30 minutes or less, TLSO brace when OOB       ASSESSMENT  Based on the objective data described below, the patient presents with impaired balance, decreased activity tolerance, weakness, and decreased ADL performance and mobility. Patient is POD 1 s/p Lumbar laminectomy and fusion. She is agreeable to participate and offers good efforts. She is receptive to all education regarding ADL adaptations, home safety, energy conservation, back precautions, log roll, and TLSO mgmt. Patient requires max A to don TLSO and is able to perform mobility in room with HHA. She is seated up in chair at end of session with visitor present.  She reports being able to tailor sit at baseline but is unable to achieve full tailor sit this session. Written handout of all education provided to increase clarity and understanding. Current Level of Function Impacting Discharge (ADLs/self-care): up to mod A lower body ADLS, Min A bathing and toileting, CGA standing grooming    Functional Outcome Measure: The patient scored 60/10 on the Barthel outcome measure which is indicative of moderate impairment in ADLs and mobility. Other factors to consider for discharge: back precautions; will be alone for portion of the day at home     Patient will benefit from skilled therapy intervention to address the above noted impairments. PLAN :  Recommendations and Planned Interventions: self care training, functional mobility training, therapeutic exercise, balance training, therapeutic activities, endurance activities, patient education, home safety training and family training/education    Frequency/Duration: Patient will be followed by occupational therapy 5 times a week to address goals. Recommendation for discharge: (in order for the patient to meet his/her long term goals)  Occupational therapy at least 2 days/week in the home     This discharge recommendation:  A follow-up discussion with the attending provider and/or case management is planned    IF patient discharges home will need the following DME: patient owns DME required for discharge       SUBJECTIVE:   Patient stated my balance feels a little off.     OBJECTIVE DATA SUMMARY:   HISTORY:   Past Medical History:   Diagnosis Date    Adverse effect of anesthesia     HARD TO WAKE UP AFTER ANESTHESIA X 1    Chronic obstructive pulmonary disease (HCC)     Heart murmur     History of shingles     Hypertension     Idiopathic hematuria 12/4/2019    PUD (peptic ulcer disease)     TIA (transient ischemic attack) 1991    NO DEFICITS    White coat syndrome with diagnosis of hypertension      Past Surgical History:   Procedure Laterality Date    HX CHOLECYSTECTOMY      HX HYSTERECTOMY      HX OTHER SURGICAL      THORACIC FUSIONS X 2        Expanded or extensive additional review of patient history:     Home Situation  Home Environment: Private residence  # Steps to Enter: 3  Rails to Enter: No  One/Two Story Residence: One story  Living Alone: No  Support Systems: Child(brian)  Current DME Used/Available at Home: Raised toilet seat, Shower chair  Tub or Shower Type: Tub/Shower combination    Hand dominance: Right    EXAMINATION OF PERFORMANCE DEFICITS:  Cognitive/Behavioral Status:  Neurologic State: Alert  Orientation Level: Oriented X4  Cognition: Appropriate decision making; Appropriate for age attention/concentration; Appropriate safety awareness; Follows commands  Perception: Appears intact  Perseveration: No perseveration noted  Safety/Judgement: Awareness of environment; Fall prevention;Good awareness of safety precautions; Home safety; Insight into deficits     Hearing: Auditory  Auditory Impairment: None    Vision/Perceptual:    Corrective Lenses: Glasses    Range of Motion:  AROM: Within functional limits  PROM: Within functional limits    Strength:  Strength: Generally decreased, functional    Coordination:  Coordination: Within functional limits  Fine Motor Skills-Upper: Left Intact; Right Intact    Gross Motor Skills-Upper: Left Intact; Right Intact    Tone & Sensation:  Tone: Normal  Sensation: Intact    Balance:  Sitting: Intact  Standing: With support; Impaired  Standing - Static: Fair;Constant support  Standing - Dynamic : Fair;Constant support    Functional Mobility and Transfers for ADLs:  Bed Mobility:  Rolling: (in chair)  Supine to Sit: Minimum assistance; Additional time(cues for log roll technique)  Sit to Supine: (Pt seated in chair at end of session)  Scooting: Contact guard assistance    Transfers:  Sit to Stand: Supervision  Stand to Sit: Supervision  Bed to Chair: Contact guard assistance;Assist x2  Bathroom Mobility: Contact guard assistance(infer based on observations)  Toilet Transfer : Contact guard assistance;Minimum assistance(infer based on observations)    ADL Assessment:  Feeding: Independent    Oral Facial Hygiene/Grooming: Contact guard assistance(infer for standing at sink; set up seated in chair)    Bathing: Minimum assistance    Upper Body Dressing: Moderate assistance(including mgmt of TLSO)    Lower Body Dressing: Minimum assistance(decreased reach to LEs; can cross legs, but not tailor sit)    Toileting: Minimum assistance(infer for standing hygiene and clothing management)    ADL Intervention and task modifications:  Patient instructed and demonstrated 3/3 back precautions with verbal cues. Upper Body Dressing Assistance  Orthotics(Brace): Maximum assistance(to don TLSO)  Cues: Physical assistance;Verbal cues provided; Tactile cues provided    Cognitive Retraining  Safety/Judgement: Awareness of environment; Fall prevention;Good awareness of safety precautions; Home safety; Insight into deficits    Patient instructed and indicated understanding the benefits of maintaining activity tolerance, functional mobility, and independence with self care tasks during acute stay  to ensure safe return home and to baseline. Encouraged patient to increase frequency and duration OOB, not sitting longer than 30 mins without marching/walking with staff, be out of bed for all meals, perform daily ADLs (as approved by RN/MD regarding bathing etc), and performing functional mobility to/from bathroom. Patient instruction and indicated understanding on body mechanics, ergonomics and gravitational force on the spine during different body positions to plan activities in prep for return home to complete basic ADLs, instrumental ADLs and back to work safely.    Bathing: Patient instructed and indicated understanding when bathing to not submerge wound in water, stand to shower or sponge bathe, cover wound with plastic and tape to ensure no water reaches bandage/wound without cues. Instructed patient to perform shower transfer (when ready) dry for safety prior to attempting wet for safety and fall prevention. Instructed patient to have supervision from family member/significant other when performing wet shower transfer for safety. Instructed patient to use clean washcloth and towel to clean/pat dry area around incision for infection control. Patient verbalized understanding of same. Dressing brace: Patient instructed and demonstrated to don/doff velcro on brace using dominant side, keeping non-dominant side intact. Patient instructed and demonstrated in meantime of being able to stand with back against wall to don/doff brace, to don/doff seated using lap and bed/chair surface to support brace while manipulating. Dressing lower body: Patient instructed to don brace first and on the benefits to remain seated to don all clothing to increase independence with precautions and pain management. Toileting: Patient instructed on the benefits of using flushable wet wipes and toilet tongs if decreased reach or pain for shun care. Also, the benefits of a reacher to aid in clothing management. Patient instruction and indicated understanding to not strain i.e. holding breath to bear down during a bowel movement, lifting/activity, and sexual activity. Home safety: Patient instructed and indicated understanding on home modifications and safety [raise height of ADL objects (i.e. clothing, sink items, fridge items, items to mouth when grooming), appropriate height of chair surfaces, recliner safety, change of floor surfaces, clear pathways] to increase independence and fall prevention. Standing: Patient instructed and indicated understanding to walk up to sink/counter top/surfaces, square off while using objects, slide objects along surfaces, to increase adherence to back precautions and fall prevention.   Patient instructed to increase amount of time standing in order to increase independence and tolerance with ADLs. During prolonged standing, can open cabinet door or place foot on stool to decrease spinal pressure/increase pain. Functional Measure:  Barthel Index:    Bathin  Bladder: 10  Bowels: 10  Groomin  Dressin  Feeding: 10  Mobility: 10  Stairs: 0  Toilet Use: 5  Transfer (Bed to Chair and Back): 10  Total: 60/100        The Barthel ADL Index: Guidelines  1. The index should be used as a record of what a patient does, not as a record of what a patient could do. 2. The main aim is to establish degree of independence from any help, physical or verbal, however minor and for whatever reason. 3. The need for supervision renders the patient not independent. 4. A patient's performance should be established using the best available evidence. Asking the patient, friends/relatives and nurses are the usual sources, but direct observation and common sense are also important. However direct testing is not needed. 5. Usually the patient's performance over the preceding 24-48 hours is important, but occasionally longer periods will be relevant. 6. Middle categories imply that the patient supplies over 50 per cent of the effort. 7. Use of aids to be independent is allowed. Viktoria Garcia., Barthel, D.W. ([id number]). Functional evaluation: the Barthel Index. [facility] (14)2. PATRICIA Vazquez, Diana Sahni, [facility], [address] [place] [address] (). Measuring the change indisability after inpatient rehabilitation; comparison of the responsiveness of the Barthel Index and Functional [place] Measure. Journal of Neurology, Neurosurgery, and Psychiatry, 66(4), 010-252. Dick Trimble, N.WOODY.FLORENTIN, MYRTLE Arredondo, & Osmel Benson, M.A. (2004.) Assessment of post-stroke quality of life in cost-effectiveness studies: The usefulness of the Barthel Index and the EuroQoL-5D.  [facility], 13, 002-31 Occupational Therapy Evaluation Charge Determination   History Examination Decision-Making   LOW Complexity : Brief history review  MEDIUM Complexity : 3-5 performance deficits relating to physical, cognitive , or psychosocial skils that result in activity limitations and / or participation restrictions LOW Complexity : No comorbidities that affect functional and no verbal or physical assistance needed to complete eval tasks       Based on the above components, the patient evaluation is determined to be of the following complexity level: LOW   Pain Rating:  Pt reporting minimal pain this session    Activity Tolerance:   Fair and SpO2 stable on RA  Please refer to the flowsheet for vital signs taken during this treatment. After treatment patient left in no apparent distress:    Sitting in chair, Call bell within reach and Caregiver / family present    COMMUNICATION/EDUCATION:   The patients plan of care was discussed with: Physical Therapist and Registered Nurse. Home safety education was provided and the patient/caregiver indicated understanding., Patient/family have participated as able in goal setting and plan of care. and Patient/family agree to work toward stated goals and plan of care. This patients plan of care is appropriate for delegation to [facility].     Thank you for this referral.  Charlie Winter OT  Time Calculation: 35 mins

## 2019-12-12 NOTE — OP NOTES
[address]  OPERATIVE REPORT    Name:  Margie Manjarrez  MR#:  [id number]  :  1960  ACCOUNT #:  [de-identified]  DATE OF SERVICE:  2019    PREOPERATIVE DIAGNOSES:  Lumbar spinal stenosis, L2-S1, with associated degenerative scoliosis; predominant right-sided lumbar radiculopathy. POSTOPERATIVE DIAGNOSES:  Lumbar spinal stenosis, L2-S1, with associated degenerative scoliosis; predominant right-sided lumbar radiculopathy. PROCEDURES PERFORMED:  1. Lumbar laminectomy of L2-S1, bilateral decompression of the L3, 4, 5, and S1 nerve roots. 2.  Bilateral lateral fusion, L3-S1, using K2M spinal instrumentation, supplemented with cancellous allograft and Proteus. SURGEON:  Chelsi Andrews MD    ASSISTANT:  Deepa Joseph PA-C    ANESTHESIA:  General.    COMPLICATIONS:  None    SPECIMENS REMOVED:  Documented on Brief Op Note    IMPLANTS:  Documented on Brief Op Note    ESTIMATED BLOOD LOSS:  400 ml    INDICATIONS:  The patient is a 67-year-old female with symptomatic lumbar stenosis, severe right-sided lumbar radiculopathy. Imaging demonstrates notable degenerative changes throughout the lumbar spine and at least a moderate grade scoliotic deformity collapse to the right side L5-S1, left side L3-4, L4-5. Given the extent of her symptomatology, a lumbar decompression with stabilization procedure offered. Potential benefits and complications reviewed. PROCEDURE:  The patient was brought to the operating room in the supine position, general anesthetic administered. The patient was placed in prone position on the Damion type frame. Low back region was then prepped and draped in normal fashion. Preoperative antibiotics administered. Thigh-high ALISON hose and sequential pumps were applied to the patient's lower extremity. An incision was made extending from L1 to the sacrum. Subcutaneous tissues then divided in line with the incision down to the level of the fascia.   The fascia was incised in the midline and subperiosteal dissection completed over the spinous process and laminar surfaces. X-ray was eventually taken to verify the level. Complete laminectomy of L2, 3, 4, 5 and S1 completed. [id number] decompression ensued. The L3-S1 nerve roots identified and decompressed starting in the lateral recess zone that fall well into the neuroforaminal zone. Wide foraminotomies completed as required. Once completed, the roots were felt to be free and mobile. I placed drill holes into the pedicles of L3, 4, 5 and S1. Pedicles were cannulated. Markers placed. X-ray taken to verify position and direction. A large amount of cancellous allograft utilized to pack over the decorticated surfaces. The bone graft has been soaked in the Proteus product. The graft was finger packed into the facet joints. Screws were then placed measuring 6.5 to 7.5 mm in diameter 45 mm in length with good purchase of all segments. These were then connected with the rods. There was appropriate contour, secured using a locking cap, and final  device. The wound had been irrigated earlier. A drain was placed. The fascia was closed using #1 Vicryl. Subcutaneous tissues and skin closed using 2-0 and 3-0 Vicryl. The patient awoken from the anesthetic, extubated, and taken to Recovery in stable condition.         MD ORTIZ Murray/[email or web address]  D:  12/11/2019 20:05  T:  12/12/2019 2:40  JOB #:  [id number]

## 2019-12-12 NOTE — PROGRESS NOTES
Problem: Mobility Impaired (Adult and Pediatric)  Goal: *Acute Goals and Plan of Care (Insert Text)  Description  FUNCTIONAL STATUS PRIOR TO ADMISSION: Patient was independent and active without use of DME.    HOME SUPPORT PRIOR TO ADMISSION: The patient lived with family but did not require assist.    Physical Therapy Goals  Initiated 12/12/2019  1. Patient will move from supine to sit and sit to supine , scoot up and down and roll side to side in bed with modified independence within 7 day(s). 2.  Patient will transfer from bed to chair and chair to bed with modified independence using the least restrictive device within 7 day(s). 3.  Patient will perform sit to stand with modified independence within 7 day(s). 4.  Patient will ambulate with modified independence for 300 feet with the least restrictive device within 7 day(s). 5.  Patient will ascend/descend 4 stairs with 1 handrail(s) with supervision/set-up within 7 day(s). Outcome: Progressing Towards Goal     PHYSICAL THERAPY EVALUATION  Patient: Stephanie Lowery (85 y.o. female)  Date: 12/12/2019  Primary Diagnosis: Lumbar spinal stenosis [M48.061]  Lumbar stenosis [M48.061]  Procedure(s) (LRB):  LUMBAR LAMINECTOMY L2-S1, POSTERIOR SPINAL FUSION L3-S1 (N/A) 1 Day Post-Op   Precautions: No bending, no lifting greater than 5 lbs, no twisting, log-roll technique, repositioning every 20-30 min except when sleeping, brace when OOB           ASSESSMENT  Based on the objective data described below, the patient presents with impaired trunk ROM, spinal precautions, minimal LE pain, balance, and weakness and gait dysfunction/intolerance causing limited independence with mobility s/p recent lami L2-S1, fusion L3-S1 POD #1. Pt received in chair, min assist to adjust brace, ambulates well with minimal to CGA for balance. Pt tolerates well. Reviewed back precautions, sitting limit of 30-45 minutes, positioning in chair, and progress.  Pt is not cleared for discharge from Physical Therapy standpoint at this time, recommend HHPT. Will benefit from therapy in acute setting, anticipate will improve tolerance quickly with improved pain control. Current Level of Function Impacting Discharge (mobility/balance): CGA for most mobility, stairs    Functional Outcome Measure: The patient scored Total: 65/100 on the Barthel Index which is indicative of moderate impaired ability to care for basic self needs/dependency on others. Patient will benefit from skilled therapy intervention to address the above noted impairments. PLAN :  Recommendations and Planned Interventions: bed mobility training, transfer training, gait training, therapeutic exercises, neuromuscular re-education, patient and family training/education, and therapeutic activities      Frequency/Duration: Patient will be followed by physical therapy:  twice daily to address goals. Recommendation for discharge: (in order for the patient to meet his/her long term goals)  Physical therapy at least 2 days/week in the home     This discharge recommendation:  Has been made in collaboration with the attending provider and/or case management    IF patient discharges home will need the following DME: patient owns DME required for discharge         SUBJECTIVE:   Patient stated i dont fit in this brace too well.  assisted with donning properly    OBJECTIVE DATA SUMMARY:   HISTORY:    Past Medical History:   Diagnosis Date    Adverse effect of anesthesia     HARD TO WAKE UP AFTER ANESTHESIA X 1    Chronic obstructive pulmonary disease (HCC)     Heart murmur     History of shingles     Hypertension     Idiopathic hematuria 12/4/2019    PUD (peptic ulcer disease)     TIA (transient ischemic attack) 1991    NO DEFICITS    White coat syndrome with diagnosis of hypertension      Past Surgical History:   Procedure Laterality Date    HX CHOLECYSTECTOMY      HX HYSTERECTOMY      HX OTHER SURGICAL      THORACIC FUSIONS X 2        Personal factors and/or comorbidities impacting plan of care:     Home Situation  Home Environment: Private residence  # Steps to Enter: 3  Rails to Enter: No  One/Two Story Residence: One story  Living Alone: No  Support Systems: Child(brian)  Current DME Used/Available at Home: Raised toilet seat, Shower chair  Tub or Shower Type: Tub/Shower combination    EXAMINATION/PRESENTATION/DECISION MAKING:   Critical Behavior:  Neurologic State: Alert  Orientation Level: Oriented X4  Cognition: Appropriate decision making, Appropriate for age attention/concentration, Appropriate safety awareness, Follows commands     Hearing:     Skin:  intact  Edema: none  Range Of Motion:  AROM: Within functional limits           PROM: Within functional limits           Strength:    Strength: Generally decreased, functional                    Tone & Sensation:   Tone: Normal              Sensation: Intact               Coordination:  Coordination: Within functional limits  Vision:      Functional Mobility:  Bed Mobility:  Rolling: (in chair)           Transfers:  Sit to Stand: Supervision  Stand to Sit: Supervision                       Balance:   Sitting: Intact  Standing: With support; Impaired  Standing - Static: Fair;Constant support  Standing - Dynamic : Fair;Constant support  Ambulation/Gait Training:  Distance (ft): 200 Feet (ft)  Assistive Device: Brace/Splint;Gait belt  Ambulation - Level of Assistance: Contact guard assistance     Gait Description (WDL): Exceptions to WDL  Gait Abnormalities: Shuffling gait        Base of Support: Widened     Speed/Bettina: Slow;Shuffled  Step Length: Right shortened;Left shortened      Functional Measure:  Barthel Index:    Bathin  Bladder: 10  Bowels: 10  Groomin  Dressin  Feeding: 10  Mobility: 10  Stairs: 0  Toilet Use: 10  Transfer (Bed to Chair and Back): 10  Total: 65/100       The Barthel ADL Index: Guidelines  1.  The index should be used as a record of what a patient does, not as a record of what a patient could do. 2. The main aim is to establish degree of independence from any help, physical or verbal, however minor and for whatever reason. 3. The need for supervision renders the patient not independent. 4. A patient's performance should be established using the best available evidence. Asking the patient, friends/relatives and nurses are the usual sources, but direct observation and common sense are also important. However direct testing is not needed. 5. Usually the patient's performance over the preceding 24-48 hours is important, but occasionally longer periods will be relevant. 6. Middle categories imply that the patient supplies over 50 per cent of the effort. 7. Use of aids to be independent is allowed. Rusty Moncada., Barthel, D.W. (7890). Functional evaluation: the Barthel Index. [address] [facility] (14)2. PATRICIA Kim, Basilia Cuadra., Lalo Jarquin., [place], [address] [facility] (1999). Measuring the change indisability after inpatient rehabilitation; comparison of the responsiveness of the Barthel Index and Functional [place] Measure. Journal of Neurology, Neurosurgery, and Psychiatry, 66(4), 618-596. Skylar Shaffer, N.J.A, JONATAN Arredondo.DIONISIO, & Honey Evans, M.A. (2004.) Assessment of post-stroke quality of life in cost-effectiveness studies: The usefulness of the Barthel Index and the EuroQoL-5D.  Quality of Life Research, 15, 916-34        Physical Therapy Evaluation Charge Determination   History Examination Presentation Decision-Making   HIGH Complexity :3+ comorbidities / personal factors will impact the outcome/ POC  HIGH Complexity : 4+ Standardized tests and measures addressing body structure, function, activity limitation and / or participation in recreation  LOW Complexity : Stable, uncomplicated  Other outcome measures barthel  MEDIUM      Based on the above components, the patient evaluation is determined to be of the following complexity level: LOW Pain Ratin/10    Activity Tolerance:   Good and requires rest breaks  Please refer to the flowsheet for vital signs taken during this treatment. After treatment patient left in no apparent distress:   Sitting in chair, Call bell within reach, and Caregiver / family present    COMMUNICATION/EDUCATION:   The patients plan of care was discussed with: Registered Nurse and . Fall prevention education was provided and the patient/caregiver indicated understanding. and Patient/family have participated as able in goal setting and plan of care.     Thank you for this referral.  Hemant Gautam Austin, PT   Time Calculation: 30 mins

## 2019-12-12 NOTE — BRIEF OP NOTE
BRIEF OPERATIVE NOTE    Date of Procedure: 12/11/2019   Preoperative Diagnosis: SEVERE SPINAL STENOSIS L2-S1, DEGENERATIVE SCOLIOSIS L2-S1  Postoperative Diagnosis: SEVERE SPINAL STENOSIS L2-S1,  DEGENERATIVE SCOLIOSIS L2-S1  Procedure(s):  LUMBAR LAMINECTOMY L2-S1, POSTERIOR SPINAL FUSION L3-S1  Surgeon(s) and Role:     * Justine Faith MD - Primary         Surgical Assistant: Alma Guy PA-C    Surgical Staff:  Circ-1: Kalpana Ngo RN  Circ-Relief: [place]  Physician Assistant: Eduard Leos PA-C  Scrub RN-1: Guadalupe Farfan RN  Event Time In Time Out   Incision Start 1717    Incision Close 2031      Anesthesia: General   Estimated Blood Loss: 400 ml  Specimens: * No specimens in log *   Findings: SEVERE SPINAL STENOSIS, L2-S1 DEGENERATIVE SCOLIOSIS V7-O6  Complications: None  Implants:   Implant Name Type Inv. Item Serial No.  Lot No. LRB No. Used Action   10cc growth factor proteios   [id number]  N/A N/A 1 Implanted   GRAFT FIBERS BNE MILES 10CC -- 3DEMIN - [id number]  GRAFT FIBERS BNE MILES 10CC -- 3DEMIN [id number] [employer] N/A N/A 1 Implanted   GRAFT BNE MILES FIBERS 30CC -- 3DEMIN - [id number]  GRAFT BNE MILES FIBERS 30CC -- 3DEMIN [id number] [employer] N/A N/A 1 Implanted   GRAFT BNE MILES FIBERS 30CC -- 3DEMIN - [id number]  GRAFT BNE MILES FIBERS 30CC -- 3DEMIN [id number] [employer] N/A N/A 1 Implanted   GRAFT FIBERS BNE MILES 10CC -- 3DEMIN - [id number]  GRAFT FIBERS BNE MILES 10CC -- 3DEMIN [id number] [employer] N/A N/A 1 Implanted   6.5.  X 45MM SCREW   N/A  N/A N/A 6 Implanted   7.5 X 45MM SCREW   N/A  N/A N/A 2 Implanted   CAPS   N/A  N/A N/A 8 Implanted   REYNOLD SPNE CONTRD [id number] -- NAIMA - SN/A  REYNOLD SPNE CONTRD [id number] -- NAIMA N/A SHERIF [employer]INE NILSON N/A N/A 2 Implanted

## 2019-12-12 NOTE — PROGRESS NOTES
Ortho Spine Daily Progress Note    Date of Surgery:  2019      Patient: Mirtha Miles   YOB: 1960  Age: 62 y.o. SUBJECTIVE:   1 Day Post-Op following LUMBAR LAMINECTOMY L2-S1, POSTERIOR SPINAL FUSION L3-S1    The patient states moderate back pain this morning. The patient states that their pre-operative lower extremity symptoms are  improved. The patient rates their current level of pain as 6-7/10. The patient's post operative pain is adequately controlled with PCA. The patient denies CP, SOB, N/V, HA. OBJECTIVE:     Vital Signs:    Temp (24hrs), Av.7 °F (36.5 °C), Min:97.3 °F (36.3 °C), Max:98.3 °F (36.8 °C)      Patient Vitals for the past 24 hrs:   BP Temp Pulse Resp SpO2 Weight   19 0257 110/62 97.5 °F (36.4 °C) (!) 55 16 100 % --   19 2353 99/61 97.4 °F (36.3 °C) (!) 52 16 100 % --   19 2250 95/58 97.3 °F (36.3 °C) 64 14 96 % --   19 2200 90/56 -- (!) 56 16 96 % --   19 94/58 -- (!) 55 22 96 % --   19 94/58 -- (!) 58 15 97 % --   19 -- -- (!) 56 19 97 % --   19 -- 97.8 °F (36.6 °C) (!) 58 19 98 % --   19 100/58 -- (!) 57 20 97 % --   19 95/53 -- (!) 56 17 98 % --   19 106/60 -- (!) 57 18 100 % --   19 126/74 -- 70 20 100 % --   19 141/76 97.6 °F (36.4 °C) 63 18 100 % --   19 137/72 -- -- -- 100 % --   19 141/76 -- -- -- -- --   19 1432 148/86 -- -- -- -- --   19 1426 (!) 138/91 98.3 °F (36.8 °C) 60 16 100 % 78.9 kg (174 lb)           Physical Exam:  General: A&Ox3, NAD. Respiratory: Respirations are unlabored.   Abdomen: S/NT  Surgical site: C,D and I.  Musculoskeletal: Calves are S/NT, Alex dorsi/plantar flexion/EHL/quads/hip flexion intact, Alex DP 2+  Neurological:  Alex LE's NVI    Laboratory Values:             Recent Labs     19  0306   HGB 9.4*   CREA 1.00   *     Lab Results   Component Value Date/Time    Sodium 142 12/12/2019 03:06 AM    Potassium 4.2 12/12/2019 03:06 AM    Chloride 112 (H) 12/12/2019 03:06 AM    CO2 26 12/12/2019 03:06 AM    Glucose 117 (H) 12/12/2019 03:06 AM    BUN 27 (H) 12/12/2019 03:06 AM    Creatinine 1.00 12/12/2019 03:06 AM    Calcium 8.2 (L) 12/12/2019 03:06 AM       Hemovac Output:   410 ml in the last 12 hours. PLAN:     S/P LUMBAR LAMINECTOMY L2-S1, POSTERIOR SPINAL FUSION L3-S1 Mobilize with PT/nursing until discharged. Spine precautions. TLSO brace when OOB. Hemodynamics Post op anemia. Tolerating well thus far. Will continue to monitor. Wound Continue dressing changes PRN. Continue hemovac drain and monitor output. Post Operative Pain Pain Control: D/C PCA, oxycodone for pain control. DVT Prophylaxis Continue with SCD'S, Encourage Ankle Pump Exercises, Mobilize. Discharge Disposition Discharge plan: Will focus on pain control and mobilizing with PT/nursing. Case Management for discharge planning. Consider d/c home with [facility] vs [facility] Rehab on POD #3 or #4 depending upon progress mobilizing and pain control. Will continue to monitor progress closely. The patient was seen and evaluated by Dr Aashish Merrill who agrees with the findings and treatment plan as outlined above.         Signed By: Joan Carlisle PA-C  December 12, 2019 8:31 AM

## 2019-12-12 NOTE — PROGRESS NOTES
TRANSFER - IN REPORT:    Verbal report received from Radha(name) on Dora Salas  being received from [employer]) for routine post - op      Report consisted of patients Situation, Background, Assessment and   Recommendations(SBAR). Information from the following report(s) SBAR, Kardex, Intake/Output and MAR was reviewed with the receiving nurse. Opportunity for questions and clarification was provided. Assessment completed upon patients arrival to unit and care assumed.

## 2019-12-12 NOTE — PROGRESS NOTES
Occupational Therapy:  12/12/19    Orders received and appreciated, chart reviewed. Patient seen and evaluated by Occupational Therapy. Recommend follow up [place] OT at discharge vs. None pending progress in acute setting. VSS during session. 12/12/19 1049 12/12/19 1051 12/12/19 1058   BP: 115/66 117/78 108/71   BP 1 Location: Right arm Right arm Right arm   BP Patient Position: At rest Sitting Standing   Pulse: (!) 57 76 88      Full note to follow.     Thank you,  Murray Mares, OTR/L

## 2019-12-12 NOTE — PROGRESS NOTES
Attempted to see pt for PM treatment-- pt sleepy, in significant pain per pt, meds due in 1 hr. Pt requests defer until later-- will have to sit up with RN and appears safe to ambulate in hallway with RN this evening. Will continue to follow.      Dexter Boggs, SUMI, PT

## 2019-12-12 NOTE — PERIOP NOTES
TRANSFER - OUT REPORT:    Verbal report given to  IMTIAZ Vann(name) on No Brown  being transferred to 543(unit) for routine post - op       Report consisted of patients Situation, Background, Assessment and   Recommendations(SBAR). Time Pre op antibiotic given:17:07 Ancef  Anesthesia Stop time: 21:03  Petty Present on Transfer to floor:ys  Order for Petty on Chart:yes  Discharge Prescriptions with Chart:no    Information from the following report(s) SBAR, Kardex, OR Summary, Procedure Summary, Intake/Output, MAR, Recent Results, Med Rec Status and Cardiac Rhythm SB/SR was reviewed with the receiving nurse. Opportunity for questions and clarification was provided. Is the patient on 02? YES       L/Min 2       Other PCA    Is the patient on a monitor? NO    Is the nurse transporting with the patient? NO    Surgical Waiting Area notified of patient's transfer from PACU? YES      The following personal items collected during your admission accompanied patient upon transfer:   Dental Appliance: Dental Appliances: Other (comment)(bag of clothes and glasses returned to pt.)  Vision: Visual Aid: Glasses  Hearing Aid:    Jewelry:    Clothing:    Other Valuables:  Other Valuables: Brace, Eyeglasses, Other (comment)(dentures to pacu)  Valuables sent to safe:

## 2019-12-13 LAB — HGB BLD-MCNC: 7.5 G/DL (ref 11.5–16)

## 2019-12-13 PROCEDURE — 74011250636 HC RX REV CODE- 250/636: Performed by: PHYSICIAN ASSISTANT

## 2019-12-13 PROCEDURE — 74011250636 HC RX REV CODE- 250/636: Performed by: NURSE PRACTITIONER

## 2019-12-13 PROCEDURE — 74011250637 HC RX REV CODE- 250/637: Performed by: PHYSICIAN ASSISTANT

## 2019-12-13 PROCEDURE — 65270000032 HC RM SEMIPRIVATE

## 2019-12-13 PROCEDURE — 97530 THERAPEUTIC ACTIVITIES: CPT

## 2019-12-13 PROCEDURE — 36415 COLL VENOUS BLD VENIPUNCTURE: CPT

## 2019-12-13 PROCEDURE — 85018 HEMOGLOBIN: CPT

## 2019-12-13 PROCEDURE — 74011250637 HC RX REV CODE- 250/637: Performed by: NURSE PRACTITIONER

## 2019-12-13 PROCEDURE — 97116 GAIT TRAINING THERAPY: CPT

## 2019-12-13 RX ORDER — DEXAMETHASONE SODIUM PHOSPHATE 10 MG/ML
10 INJECTION INTRAMUSCULAR; INTRAVENOUS EVERY 8 HOURS
Status: COMPLETED | OUTPATIENT
Start: 2019-12-13 | End: 2019-12-13

## 2019-12-13 RX ORDER — POLYETHYLENE GLYCOL 3350 17 G/17G
17 POWDER, FOR SOLUTION ORAL 2 TIMES DAILY
Status: DISCONTINUED | OUTPATIENT
Start: 2019-12-13 | End: 2019-12-14 | Stop reason: HOSPADM

## 2019-12-13 RX ORDER — POLYETHYLENE GLYCOL 3350 17 G/17G
17 POWDER, FOR SOLUTION ORAL
Qty: 20 PACKET | Refills: 0 | Status: SHIPPED | OUTPATIENT
Start: 2019-12-13 | End: 2019-12-13

## 2019-12-13 RX ORDER — AMOXICILLIN 250 MG
1 CAPSULE ORAL 2 TIMES DAILY
Qty: 60 TAB | Refills: 0 | Status: SHIPPED | OUTPATIENT
Start: 2019-12-13 | End: 2022-02-01 | Stop reason: ALTCHOICE

## 2019-12-13 RX ORDER — METHOCARBAMOL 500 MG/1
500 TABLET, FILM COATED ORAL
Status: DISCONTINUED | OUTPATIENT
Start: 2019-12-13 | End: 2019-12-14 | Stop reason: HOSPADM

## 2019-12-13 RX ORDER — POLYETHYLENE GLYCOL 3350 17 G/17G
17 POWDER, FOR SOLUTION ORAL
Qty: 20 PACKET | Refills: 0 | Status: SHIPPED | OUTPATIENT
Start: 2019-12-13 | End: 2021-10-12

## 2019-12-13 RX ORDER — OXYCODONE HYDROCHLORIDE 10 MG/1
10 TABLET ORAL
Qty: 60 TAB | Refills: 0 | Status: SHIPPED | OUTPATIENT
Start: 2019-12-13 | End: 2019-12-20

## 2019-12-13 RX ADMIN — OXYCODONE 10 MG: 5 TABLET ORAL at 01:25

## 2019-12-13 RX ADMIN — MELATONIN 2 TABLET: at 08:53

## 2019-12-13 RX ADMIN — OXYCODONE 10 MG: 5 TABLET ORAL at 08:53

## 2019-12-13 RX ADMIN — Medication 10 ML: at 21:57

## 2019-12-13 RX ADMIN — OXYCODONE 10 MG: 5 TABLET ORAL at 14:32

## 2019-12-13 RX ADMIN — POLYETHYLENE GLYCOL 3350 17 G: 17 POWDER, FOR SOLUTION ORAL at 08:53

## 2019-12-13 RX ADMIN — SENNOSIDES AND DOCUSATE SODIUM 1 TABLET: 8.6; 5 TABLET ORAL at 17:57

## 2019-12-13 RX ADMIN — DEXAMETHASONE SODIUM PHOSPHATE 10 MG: 10 INJECTION INTRAMUSCULAR; INTRAVENOUS at 17:57

## 2019-12-13 RX ADMIN — OXYCODONE 10 MG: 5 TABLET ORAL at 11:33

## 2019-12-13 RX ADMIN — SENNOSIDES AND DOCUSATE SODIUM 1 TABLET: 8.6; 5 TABLET ORAL at 08:53

## 2019-12-13 RX ADMIN — SODIUM CHLORIDE 500 ML: 900 INJECTION, SOLUTION INTRAVENOUS at 12:12

## 2019-12-13 RX ADMIN — Medication 10 ML: at 05:19

## 2019-12-13 RX ADMIN — POLYETHYLENE GLYCOL 3350 17 G: 17 POWDER, FOR SOLUTION ORAL at 17:57

## 2019-12-13 RX ADMIN — ATORVASTATIN CALCIUM 10 MG: 10 TABLET, FILM COATED ORAL at 08:53

## 2019-12-13 RX ADMIN — OXYCODONE 10 MG: 5 TABLET ORAL at 18:10

## 2019-12-13 RX ADMIN — DEXAMETHASONE SODIUM PHOSPHATE 10 MG: 10 INJECTION INTRAMUSCULAR; INTRAVENOUS at 11:00

## 2019-12-13 RX ADMIN — OXYCODONE 10 MG: 5 TABLET ORAL at 21:57

## 2019-12-13 RX ADMIN — METHOCARBAMOL TABLETS 500 MG: 500 TABLET, COATED ORAL at 12:14

## 2019-12-13 RX ADMIN — OXYCODONE 10 MG: 5 TABLET ORAL at 05:14

## 2019-12-13 NOTE — PROGRESS NOTES
Problem: Self Care Deficits Care Plan (Adult)  Goal: *Acute Goals and Plan of Care (Insert Text)  Description  FUNCTIONAL STATUS PRIOR TO ADMISSION: Patient was independent and active without use of DME. Patient was independent for basic and instrumental ADLs. HOME SUPPORT: The patient lived with 2 sons but did not require assist. Both work, but pt would only be alone for about 3 hours in the afternoon. Occupational Therapy Goals  Initiated 12/12/2019    1. Patient will perform lower body dressing with supervision/set-up within 7 days. 2.  Patient will perform toilet transfer with modified independence using most appropriate DME within 7 days. 3.  Patient will perform all aspects of toileting at supervision/set-up within 7 days. 4.  Patient will don/doff back brace at minimal assistance within 7 days. 5.  Patient will verbalize/demonstrate 3/3 back precautions during ADL tasks without cues within 7 days. 12/13/2019 1428 by [place]  Outcome: Progressing Towards Goal    OCCUPATIONAL THERAPY TREATMENT  Patient: Tyesha Araiza (17 y.o. female)  Date: 12/13/2019  Diagnosis: Lumbar spinal stenosis [M48.061]  Lumbar stenosis [M48.061]   <principal problem not specified>  Procedure(s) (LRB):  LUMBAR LAMINECTOMY L2-S1, POSTERIOR SPINAL FUSION L3-S1 (N/A) 2 Days Post-Op  Precautions:    Chart, occupational therapy assessment, plan of care, and goals were reviewed. ASSESSMENT  Patient continues with skilled OT services and is progressing towards goals. Patient in hallway walking with PT when needed to sit down due to low BP. Provided wheelchair follow for safety. Attempted to train on LB ADLS once back in room but patient not focused or up to it. She stated she had a reacher but not a sock aide. Will need additional training or assist at home with LB ADLS.     Current Level of Function Impacting Discharge (ADLs): up to Max a    Other factors to consider for discharge: none         PLAN :  Patient continues to benefit from skilled intervention to address the above impairments. Continue treatment per established plan of care. to address goals. Recommend with staff: assist to bathroom    Recommend next OT session: [facility] AE training    Recommendation for discharge: (in order for the patient to meet his/her long term goals)  Occupational therapy at least 2 days/week in the home     This discharge recommendation:  Has been made in collaboration with the attending provider and/or case management    IF patient discharges home will need the following DME: patient owns DME required for discharge       SUBJECTIVE:   Patient stated I don't feel good.     OBJECTIVE DATA SUMMARY:   Cognitive/Behavioral Status:         alert             Functional Mobility and Transfers for ADLs:  Bed Mobility:  Rolling: Minimum assistance  Sit to Supine: Minimum assistance    Transfers:  Sit to Stand: Supervision     Bed to Chair: Stand-by assistance    Balance:  Sitting: Intact  Standing: Intact  Standing - Static: Good  Standing - Dynamic : Good;Constant support(fair without support)    ADL Intervention:    Lower Body Dressing Assistance  Dressing Assistance: (Discussed compensatory strategies, did not want to attempt)    The patient recalled and demonstrated 3/3 back precautions. Reviewed all 3 with patient. Dressing lower body: Patient instructed to don brace first and on the benefits to remain seated to don all clothing to increase independence with precautions and pain management. Pain:  Reports back pain post activity    Activity Tolerance:   signs and symptoms of orthostatic hypotension  Please refer to the flowsheet for vital signs taken during this treatment.     After treatment patient left in no apparent distress:   Sitting in chair and Call bell within reach    COMMUNICATION/COLLABORATION:   The patients plan of care was discussed with: Physical Therapist and Registered Nurse    Roseann Crowley Calculation: 11 mins

## 2019-12-13 NOTE — PROGRESS NOTES
Ortho Spine Daily Progress Note    Date of Surgery:  2019      Patient: Sandra Moreno   YOB: 1960  Age: 62 y.o. SUBJECTIVE:   2 Days Post-Op following LUMBAR LAMINECTOMY L2-S1, POSTERIOR SPINAL FUSION L3-S1    The patient states back pain this morning. The patient states that their pre-operative lower extremity symptoms are improved. The patient rates their current level of pain as 6-7/10. The patient's post operative pain is adequately controlled. The patient has been mobilizing well with PT. The patient denies CP, SOB, N/V/D, dizziness, abdominal pain, HA. OBJECTIVE:     Vital Signs:    Temp (24hrs), Av.2 °F (36.8 °C), Min:97.5 °F (36.4 °C), Max:99.3 °F (37.4 °C)      Patient Vitals for the past 24 hrs:   BP Temp Pulse Resp SpO2   19 0440 105/59 99.3 °F (37.4 °C) 75 14 95 %   195 120/76 97.5 °F (36.4 °C) 78 14 95 %   19 2035 99/64 98.6 °F (37 °C) 71 14 99 %   19 1557 126/77 98 °F (36.7 °C) (!) 59 16 98 %   19 1058 108/71 -- 88 -- --   19 1051 117/78 -- 76 -- --   19 1049 115/66 -- (!) 57 -- --   19 1001 133/82 97.7 °F (36.5 °C) 69 15 100 %           Physical Exam:  General: A&Ox3, NAD. Respiratory: Respirations are unlabored. Abdomen: S/NT  Surgical site: C,D and I.  Musculoskeletal: Calves are S/NT, Alex dorsi/plantar flexion/EHL/quads/hip flexion intact, Alex DP 2+  Neurological:  Alex LE's NVI    Laboratory Values:             Recent Labs     19  0306   HGB 9.4*   CREA 1.00   *     Lab Results   Component Value Date/Time    Sodium 142 2019 03:06 AM    Potassium 4.2 2019 03:06 AM    Chloride 112 (H) 2019 03:06 AM    CO2 26 2019 03:06 AM    Glucose 117 (H) 2019 03:06 AM    BUN 27 (H) 2019 03:06 AM    Creatinine 1.00 2019 03:06 AM    Calcium 8.2 (L) 2019 03:06 AM       Hemovac Output:   100 ml in the last 12 hours.     PLAN:     S/P LUMBAR LAMINECTOMY L2-S1, POSTERIOR SPINAL FUSION L3-S1 Mobilize with PT/nursing until discharged. Spine precautions and TLSO when OOB. Hemodynamics Post op anemia. Tolerating well thus far. Will continue to monitor. Wound Continue dressing changes PRN. Post Operative Pain Pain Control: Adequate, continue current regimen. DVT Prophylaxis Continue with SCD'S, Encourage Ankle Pump Exercises, Mobilize. Discharge Disposition Discharge plan: Will focus on pain control and mobilizing with PT/nursing. Case Management for discharge planning. Will plan on discharge home on Sunday if doing well. Will continue to monitor progress closely.                  Signed By: Naren Calixto PA-C  December 13, 2019 7:43 AM

## 2019-12-13 NOTE — PROGRESS NOTES
Problem: Self Care Deficits Care Plan (Adult) Goal: *Acute Goals and Plan of Care (Insert Text) Description FUNCTIONAL STATUS PRIOR TO ADMISSION: Patient was independent and active without use of DME. Patient was independent for basic and instrumental ADLs. HOME SUPPORT: The patient lived with 2 sons but did not require assist. Both work, but pt would only be alone for about 3 hours in the afternoon. Occupational Therapy Goals Initiated 12/12/2019 1. Patient will perform lower body dressing with supervision/set-up within 7 days. 2.  Patient will perform toilet transfer with modified independence using most appropriate DME within 7 days. 3.  Patient will perform all aspects of toileting at supervision/set-up within 7 days. 4.  Patient will don/doff back brace at minimal assistance within 7 days. 5.  Patient will verbalize/demonstrate 3/3 back precautions during ADL tasks without cues within 7 days. Outcome: Progressing Towards Goal 
  
 
OCCUPATIONAL THERAPY TREATMENT Patient: Tameka Maravilla (89 y.o. female) Date: 12/13/2019 Diagnosis: Lumbar spinal stenosis [M48.061] Lumbar stenosis [M48.061] <principal problem not specified> Procedure(s) (LRB): LUMBAR LAMINECTOMY L2-S1, POSTERIOR SPINAL FUSION L3-S1 (N/A) 2 Days Post-Op Precautions:   
Chart, occupational therapy assessment, plan of care, and goals were reviewed. ASSESSMENT Patient continues with skilled OT services and {IS/IS NOT:32119} progressing towards goals. *** Current Level of Function Impacting Discharge (ADLs): *** Other factors to consider for discharge: *** PLAN : 
Patient continues to benefit from skilled intervention to address the above impairments. Continue treatment per established plan of care. to address goals. Recommend with staff: *** Recommend next OT session: *** Recommendation for discharge: (in order for the patient to meet his/her long term goals) {therapy discharge recs:81073:::0} This discharge recommendation: 
{therapy discharge collaboration:69695:::0} 
 
IF patient discharges home will need the following DME: {DME Devices:82465} SUBJECTIVE:  
Patient stated ***. OBJECTIVE DATA SUMMARY:  
Cognitive/Behavioral Status: 
  
  
  
  
  
  
 
Functional Mobility and Transfers for ADLs: 
Bed Mobility: 
Rolling: Minimum assistance Sit to Supine: Minimum assistance Transfers: 
Sit to Stand: Supervision Bed to Chair: Stand-by assistance Balance: 
Sitting: Intact Standing: Intact Standing - Static: Good Standing - Dynamic : Good;Constant support(fair without support) ADL Intervention: 
  
 
  
 
  
 
  
 
Upper Body Dressing Assistance Orthotics(Brace): Maximum assistance(for TLSO) The patient recalled and demonstrated {Numbers; 1-4 :[id number]}/3 back precautions. Reviewed all 3 with patient. Bathing: Patient instructed and indicated understanding when bathing to not submerge wound in water, stand to shower or sponge bathe, cover wound with plastic and tape to ensure no water reaches bandage/wound without cues. Dressing brace: Patient instructed and demonstrated to don/doff velcro on brace using dominant side, keeping non-dominant side intact. Patient instructed and demonstrated in meantime of being able to stand with back against wall to don/doff brace, to don/doff seated using lap and bed/chair surface to support brace while manipulating. Dressing lower body: Patient instructed to don brace first and on the benefits to remain seated to don all clothing to increase independence with precautions and pain management. Toileting: Patient instructed on the benefits of using flushable wet wipes and toilet tongs if decreased reach or pain for shun care. Also, the benefits of a reacher to aid in clothing management.   
Home safety: Patient instructed and indicated understanding on home modifications and safety (raise height of ADL objects, appropriate height of chair surfaces, recliner safety, change of floor surfaces, clear pathways) to increase independence and fall prevention with ***. Standing: Patient instructed and indicated understanding to walk up to sink/counter top/surfaces, step into walker, square off while using objects, slide objects along surfaces, to increase adherence to back precautions and fall prevention. Patient instructed to increase amount of time standing in order to increase independence and tolerance with ADLs. During prolonged standing, can open cabinet door or place foot on stool to decrease spinal pressure/increase pain. Tub transfer: Patient instructed and indicated understanding regarding when it is safe to begin transfer into tub (complete stairs with PT, advance exercises with PT high enough to clear tub height, and while clothes donned practice with another person present). Patient instructed and indicated understanding to use the same technique as used with stairs when entering and exiting tub (\"up with good leg, down with bad leg\"). Patient instructed and indicated understanding the benefits of maintaining activity tolerance, functional mobility, and independence with self care tasks during acute stay  to ensure safe return home and to baseline. Encouraged patient to increase frequency and duration OOB, not sitting longer than 30 mins without marching/walking with staff, be out of bed for all meals, perform daily ADLs (as approved by RN/MD regarding bathing etc), and performing functional mobility to/from bathroom. Patient instruction and indicated understanding on body mechanics, ergonomics and gravitational force on the spine during different body positions to plan activities in prep for return home to complete instrumental ADLs and back to work safely.   
 
Patient instructed and demonstrated while supine hip ER stretch and hold 10 seconds to increase ROM in prep for lower body ADLs daily with {list:87044}. Therapeutic Exercises:  
Patient instructed on the benefits shoulder exercises to increase independence with ADLs, active ROM, and strength of spine. Patient demonstrated *** reps and *** set(s) while *** with {list:68957}. Patient instructed and demonstrated techniques of activating abdominal muscles. Patient instructed and indicated understanding how to progress reps and sets against gravity to then working up to 5 lbs, until surgeon clears, in which can use household items for weights. Pain: 
*** Activity Tolerance:  
{therapy activity tolerance:64141:::0} Please refer to the flowsheet for vital signs taken during this treatment. After treatment patient left in no apparent distress:  
{AFTER therapy treatment:16376:::0} 
 
COMMUNICATION/COLLABORATION:  
The patients plan of care was discussed with: {therapies:48410407:::0} 
 
Yesy Doshi Time Calculation: 11 mins

## 2019-12-13 NOTE — PROGRESS NOTES
Problem: Mobility Impaired (Adult and Pediatric)  Goal: *Acute Goals and Plan of Care (Insert Text)  Description  FUNCTIONAL STATUS PRIOR TO ADMISSION: Patient was independent and active without use of DME.    HOME SUPPORT PRIOR TO ADMISSION: The patient lived with family but did not require assist.    Physical Therapy Goals  Initiated 12/12/2019  1. Patient will move from supine to sit and sit to supine , scoot up and down and roll side to side in bed with modified independence within 7 day(s). 2.  Patient will transfer from bed to chair and chair to bed with modified independence using the least restrictive device within 7 day(s). 3.  Patient will perform sit to stand with modified independence within 7 day(s). 4.  Patient will ambulate with modified independence for 300 feet with the least restrictive device within 7 day(s). 5.  Patient will ascend/descend 4 stairs with 1 handrail(s) with supervision/set-up within 7 day(s). 12/13/2019 1405 by Martín Serna, PT  Outcome: Progressing Towards Goal  12/13/2019 1147 by Martín Serna, PT  Outcome: Progressing Towards Goal      PHYSICAL THERAPY TREATMENT  Patient: Jada Bryan (18 y.o. female)  Date: 12/13/2019  Diagnosis: Lumbar spinal stenosis [M48.061]  Lumbar stenosis [M48.061]   <principal problem not specified>  Procedure(s) (LRB):  LUMBAR LAMINECTOMY L2-S1, POSTERIOR SPINAL FUSION L3-S1 (N/A) 2 Days Post-Op  Precautions:    Chart, physical therapy assessment, plan of care and goals were reviewed. ASSESSMENT  Patient continues with skilled PT services and is progressing towards goals. Pt tolerates sitting upright throughout lunch, requests return to bed post lunch. Pt transfers to the bed and requires min A for returning to supine. Pt positioned with L sided propping pillow. Will continue to follow.      Current Level of Function Impacting Discharge (mobility/balance): min A bed mobility         PLAN :  Patient continues to benefit from skilled intervention to address the above impairments. Continue treatment per established plan of care. to address goals. Recommendation for discharge: (in order for the patient to meet his/her long term goals)  Physical therapy at least 2 days/week in the home     This discharge recommendation:  Has been made in collaboration with the attending provider and/or case management    IF patient discharges home will need the following DME: patient owns DME required for discharge       SUBJECTIVE:   Patient stated I am really tired, will you help me back to bed.     OBJECTIVE DATA SUMMARY:   Critical Behavior:  Neurologic State: Alert  Orientation Level: Oriented X4  Cognition: Appropriate decision making, Appropriate for age attention/concentration, Appropriate safety awareness, Follows commands  Safety/Judgement: Awareness of environment, Fall prevention, Good awareness of safety precautions, Home safety, Insight into deficits  Functional Mobility Training:  Bed Mobility:  Rolling: Minimum assistance     Sit to Supine: Minimum assistance           Transfers:  Sit to Stand: Supervision  Stand to Sit: Supervision        Bed to Chair: Stand-by assistance                    Balance:  Sitting: Intact  Standing: Intact  Standing - Static: Good  Standing - Dynamic : Good;Constant support(fair without support)  Ambulation/Gait Training:  None performed    Pain Rating:  Minimally increased pain    Activity Tolerance:   Fair and requires rest breaks  Please refer to the flowsheet for vital signs taken during this treatment.     After treatment patient left in no apparent distress:   Supine in bed    COMMUNICATION/COLLABORATION:   The patients plan of care was discussed with: Registered Nurse and     Edward Austin, PT   Time Calculation: 10 mins

## 2019-12-13 NOTE — PROGRESS NOTES
Problem: Mobility Impaired (Adult and Pediatric)  Goal: *Acute Goals and Plan of Care (Insert Text)  Description  FUNCTIONAL STATUS PRIOR TO ADMISSION: Patient was independent and active without use of DME.    HOME SUPPORT PRIOR TO ADMISSION: The patient lived with family but did not require assist.    Physical Therapy Goals  Initiated 12/12/2019  1. Patient will move from supine to sit and sit to supine , scoot up and down and roll side to side in bed with modified independence within 7 day(s). 2.  Patient will transfer from bed to chair and chair to bed with modified independence using the least restrictive device within 7 day(s). 3.  Patient will perform sit to stand with modified independence within 7 day(s). 4.  Patient will ambulate with modified independence for 300 feet with the least restrictive device within 7 day(s). 5.  Patient will ascend/descend 4 stairs with 1 handrail(s) with supervision/set-up within 7 day(s). Outcome: Progressing Towards Goal       PHYSICAL THERAPY TREATMENT  Patient: Elsa Norman (28 y.o. female)  Date: 12/13/2019  Diagnosis: Lumbar spinal stenosis [M48.061]  Lumbar stenosis [M48.061]   <principal problem not specified>  Procedure(s) (LRB):  LUMBAR LAMINECTOMY L2-S1, POSTERIOR SPINAL FUSION L3-S1 (N/A) 2 Days Post-Op  Precautions:    Chart, physical therapy assessment, plan of care and goals were reviewed. ASSESSMENT  Based on the objective data described below, the patient presents in bed, mod assist to don brace. Pt had episode of leg pain and SOB. Pt tolerates initially well without SOB however begins developing leg pain and mild weakness. Pt sat down and BP noted to be 100/64. Pt stands and ambulates additional 20ft, drops to 88/53 and with worsening weakness and breathlessness. Reviewed back precautions, sitting limit of 30-45 minutes, positioning in chair, and progress.  Pt is not cleared for discharge from Physical Therapy standpoint at this time, recommend HHPT. Will benefit from therapy in acute setting, anticipate will improve tolerance quickly with improved pain control. Current Level of Function Impacting Discharge (mobility/balance): min A at times         PLAN :  Patient continues to benefit from skilled intervention to address the above impairments. Continue treatment per established plan of care. to address goals. Recommendation for discharge: (in order for the patient to meet his/her long term goals)  Physical therapy at least 2 days/week in the home     This discharge recommendation:  Has been made in collaboration with the attending provider and/or case management    IF patient discharges home will need the following DME: patient owns DME required for discharge       SUBJECTIVE:   Patient stated I dont feel too good.  after 120ft    OBJECTIVE DATA SUMMARY:   Critical Behavior:  Neurologic State: Alert  Orientation Level: Oriented X4  Cognition: Appropriate decision making, Appropriate for age attention/concentration, Appropriate safety awareness, Follows commands  Safety/Judgement: Awareness of environment, Fall prevention, Good awareness of safety precautions, Home safety, Insight into deficits  Functional Mobility Training:  Bed Mobility:                    Transfers:  Sit to Stand: Supervision;Minimum assistance(supervision initially, progresses to min A with dizziness)  Stand to Sit: Supervision                             Balance:  Sitting: Intact; Without support  Standing: Intact; With support; Without support  Standing - Static: Good  Standing - Dynamic : Good;Constant support(fair without support)  Ambulation/Gait Training:  Distance (ft): 120 Feet (ft)(+20)  Assistive Device: Brace/Splint;Gait belt; Other (comment)(hand held assistance )  Ambulation - Level of Assistance: Contact guard assistance        Gait Abnormalities: Shuffling gait        Base of Support: Widened     Speed/Bettina: Slow;Shuffled  Step Length: Right shortened;Left shortened      Activity Tolerance:   Good and requires rest breaks  Please refer to the flowsheet for vital signs taken during this treatment.     After treatment patient left in no apparent distress:   Sitting in chair and Call bell within reach    COMMUNICATION/COLLABORATION:   The patients plan of care was discussed with: Registered Nurse and     Hebert Austin, PT   Time Calculation: 30 mins

## 2019-12-13 NOTE — DISCHARGE INSTRUCTIONS
After Hospital Care Plan:  Discharge Instructions Lumbar Fusion Surgery   Dr. Zarina Orta  Patient Name:  Korina White    Date of procedure:  12/11/2019  Date of discharge: 12/13/2019    Procedure:  Procedure(s):  LUMBAR LAMINECTOMY L2-S1, POSTERIOR SPINAL FUSION L3-S1    PCP: Bk Washburn MD    Follow up appointments  -Follow up with Dr. Zarina Orta in 2 weeks. Call Dr. Latanya Salgado at [phone], Ext [phone], once you get home from the hospital to make an appointment for your 2 week postoperative visit. Home Health Agency: ____________________   phone: _______________________  The agency will contact you to arrange dates/times for visits. Please call them if you do not hear from them within 24 hours after you are discharged  Physical therapy 3 times a week for 3 weeks  Nursing-initial assessment and as needed    When to call your Orthopaedic Surgeon:  -Signs of infection-if your incision is red; continues to have drainage; drainage has a foul odor or if you have a persistent fever over 101 degrees for 24 hours  -Nausea or vomiting, severe headache  -Loss of bowel or bladder function, inability to urinate  -Changes in sensation in your arms or legs (numbness, tingling, loss of color)  -Increased weakness-greater than before your surgery  -Severe pain or pain not relieved by medications  -Signs of a blood clot in your leg-calf pain, tenderness, redness, swelling of lower leg    When to call your Primary Care Physician:  -Concerns about medical conditions such as diabetes, high blood pressure, asthma, congestive heart failure  -Call if blood sugars are elevated, persistent headache or dizziness, coughing or congestion, constipation or diarrhea, burning with urination, abnormal heart rate    When to call 911 and go to the nearest emergency room:  -Acute onset of chest pain, shortness of breath, difficulty breathing    Activity  -You are going home a well person, be as active as possible.   Your only exercise should be walking. Start with short frequent walks and increase your walking distance each day.  -Limit the amount of time you sit to 20-30 minute intervals. Sitting for prolonged periods of time will be uncomfortable for you following surgery.  -Do NOT lift anything over 5 pounds  -Do NOT do any straining, twisting or bending  -When you are in bed, you may lay on your back or on either side. Do NOT lie on your stomach    Brace  -If you have a back brace, you should wear your brace at all times when you are out of bed. Do not wear the brace while in bed or showering.  -Remember to always wear a cotton t-shirt underneath your brace.  -Do not bend or twist when your brace is off. Diet  -Resume usual diet; drink plenty of fluids; eat foods high in fiber.  -It is important to have regular bowel movements. Pain medications may cause constipation. You may want to take a stool softener (such as Senokot-S or Colace) to prevent constipation.   -If constipation occurs, take a laxative (such as Dulcolax tablets, Milk of Magnesia, or a suppository). Laxatives should only be used if the above preventable measures have failed and you still have not had a bowel movement after three days. Driving  -You may not drive or return to work until instructed by your physician. However, you may ride in the car for short periods of time. Incision Care  -You may take brief showers but do not run the water run directly onto the wound. After showering or bathing, remove the wet dressing and gently blot the wound dry with a soft towel.  -Do not rub or apply any lotions or ointments to your incision site.   -Do not soak or scrub your wound  -Have your caregiver wash their hands thoroughly before changing your dressing. If the dressing becomes saturated, please call the office.  -You will have absorbable sutures and steri-strips (white tape) on your incision.   Leave the steri-strips on until they fall off.    Showering  -You may shower in approximately 4 days after your surgery.    -Leave the dressing on during your shower. Do NOT allow the water to run directly onto your dressing. Once you get out of the shower, place a new, dry dressing.  -Reminder- your brace can be removed while showering. Remember to not bend or twist while your brace is off.    -Do not take a tub bath. Preventing blood clots  -You have been given T.E.D. stockings to wear. Continue to wear these for 7 days after your discharge. Put them on in the morning and take them off at night.    -They are used to increase your circulation and prevent blood clots from forming in your legs  -T. E.D. stockings can be machine washed, temperature not to exceed 160° F (71°C) and machine dried for 15 to 20 minutes, temperature not to exceed 250° F (121°C). Pain management  -Take pain medication as prescribed; decrease the amount you use as your pain lessens. -DO not wait until you are in extreme pain to take your medication.  -Avoid alcoholic beverages while taking pain medication    Pain Medication Safety  DO:  -Read the Medication Guide   -Take your medicine exactly as prescribed   -Store your medicine away from children and in a safe place   -Flush unused medicine down the toilet   -Call your healthcare provider for medical advice about side effects. You may report side effects to FDA at 1-790-FDA-6013.   -Please be aware that many medications contain Tylenol. We do not want you to over medicate so please read the information below as a guide. Do not take more than 4 Grams of Tylenol in a 24 hour period.   (There are 1000 milligrams in one Gram) Percocet contains 325 mg of Tylenol per tablet (do not take more than 12 tablets in 24 hours)  Lortab contains 500 mg of Tylenol per tablet (do not take more than 8 tablets in 24 hours)  Norco contains 325 mg of Tylenol per tablet (do not take more than 12 tablets in 24 hours). DO NOT:  -Do not give your medicine to others   -Do not take medicine unless it was prescribed for you   -Do not stop taking your medicine without talking to your healthcare provider   -Do not break, chew, crush, dissolve, or inject your medicine. If you cannot swallow your medicine whole, talk to your healthcare provider.  -Do not drink alcohol while taking this medicine  -Do not take anti-inflammatory medications or aspirin unless instructed by your     physician.

## 2019-12-14 VITALS
HEART RATE: 80 BPM | TEMPERATURE: 98.4 F | OXYGEN SATURATION: 96 % | DIASTOLIC BLOOD PRESSURE: 75 MMHG | RESPIRATION RATE: 16 BRPM | WEIGHT: 174 LBS | BODY MASS INDEX: 28.51 KG/M2 | SYSTOLIC BLOOD PRESSURE: 121 MMHG

## 2019-12-14 PROCEDURE — 97535 SELF CARE MNGMENT TRAINING: CPT

## 2019-12-14 PROCEDURE — 97530 THERAPEUTIC ACTIVITIES: CPT

## 2019-12-14 PROCEDURE — 97116 GAIT TRAINING THERAPY: CPT

## 2019-12-14 PROCEDURE — 74011250637 HC RX REV CODE- 250/637: Performed by: NURSE PRACTITIONER

## 2019-12-14 PROCEDURE — 74011250637 HC RX REV CODE- 250/637: Performed by: PHYSICIAN ASSISTANT

## 2019-12-14 RX ADMIN — POLYETHYLENE GLYCOL 3350 17 G: 17 POWDER, FOR SOLUTION ORAL at 09:16

## 2019-12-14 RX ADMIN — OXYCODONE 10 MG: 5 TABLET ORAL at 06:23

## 2019-12-14 RX ADMIN — OXYCODONE 10 MG: 5 TABLET ORAL at 02:43

## 2019-12-14 RX ADMIN — SENNOSIDES AND DOCUSATE SODIUM 1 TABLET: 8.6; 5 TABLET ORAL at 09:17

## 2019-12-14 RX ADMIN — OXYCODONE 10 MG: 5 TABLET ORAL at 16:17

## 2019-12-14 RX ADMIN — OXYCODONE 10 MG: 5 TABLET ORAL at 12:14

## 2019-12-14 RX ADMIN — OXYCODONE 10 MG: 5 TABLET ORAL at 09:18

## 2019-12-14 RX ADMIN — MELATONIN 2 TABLET: at 09:17

## 2019-12-14 NOTE — PROGRESS NOTES
Pt seen - POD #3 lumbar laminectomy - Bp stable, no complains of dizziness , pain well controlled. Pt did well with PT - ambulated > 200 with supervision/use of RW and up/down 4 steps with standby guard. Pt cleared for discharge from PT standpoint - nursing notified.   Full note to britt -   Kevon Citizen, PT

## 2019-12-14 NOTE — PROGRESS NOTES
Orthopedic Spine Progress Note  Post Op day: 3 Days Post-Op    2019 2:12 PM   Admit Date: 2019  Procedure: Procedure(s):  LUMBAR LAMINECTOMY L2-S1, POSTERIOR SPINAL FUSION L3-S1    Subjective:     Jacquelyn Caicedo has no complaints. Tolerating diet. No N/V. Progressing well with physical therapy. Pain Control:   Pain Assessment  Pain Scale 1: Numeric (0 - 10)  Pain Intensity 1: 6  Pain Onset 1: postop  Pain Location 1: Back  Pain Orientation 1: Lower  Pain Description 1: Aching  Pain Intervention(s) 1: Medication (see MAR)    Objective:          Physical Exam:  General:  Alert and oriented. No acute distress. Heart:  Respirations unlabored. Abdomen:   Extremities: Soft, non-tender. No evidence of cyanosis. Pulses palpable in both upper and lower extremities. Neurologic:  Musculoskeletal:  No new motor deficits. Neurovascular exam within normal limits. Sensation stable. Motor: unchanged C5-T1 and L2-S1. Jayme's sign negative in bilateral lower extremities. Calves soft, nontender upon palpation and with passive twitch. Moves both upper and lower extremities. Incision: clean, dry, and intact. No significant erythema or swelling. No active drainage noted. Vital Signs:    Blood pressure 121/75, pulse 80, temperature 98.4 °F (36.9 °C), resp. rate 16, weight 78.9 kg (174 lb), SpO2 96 %. Temp (24hrs), Av.5 °F (36.9 °C), Min:98.3 °F (36.8 °C), Max:98.6 °F (37 °C)      LAB:    Recent Labs     19  0516   HGB 7.5*     Lab Results   Component Value Date/Time    Sodium 142 2019 03:06 AM    Potassium 4.2 2019 03:06 AM    Chloride 112 (H) 2019 03:06 AM    CO2 26 2019 03:06 AM    Glucose 117 (H) 2019 03:06 AM    BUN 27 (H) 2019 03:06 AM    Creatinine 1.00 2019 03:06 AM    Calcium 8.2 (L) 2019 03:06 AM       Intake/Output:No intake/output data recorded.    1901 -  0700  In: -   Out: 600 [Urine:500; Drains:100]    PT/OT:   Gait:  Gait  Base of Support: Widened  Speed/Bettina: Slow  Step Length: Right shortened, Left shortened  Gait Abnormalities: Decreased step clearance  Ambulation - Level of Assistance: Stand-by assistance  Distance (ft): 225 Feet (ft)  Assistive Device: Brace/Splint, Gait belt                 Assessment:   Patient is 3 Days Post-Op s/p Procedure(s):  LUMBAR LAMINECTOMY L2-S1, POSTERIOR SPINAL FUSION L3-S1    Plan:     1. Continue PT/OT - cleared to go home  2. Continue established methods of pain control  3. VTE Prophylaxes - TEDS &/or SCDs   4.   Ok for discharge  5.  6.       Signed By: Xi Hicks PA-C

## 2019-12-14 NOTE — PROGRESS NOTES
Problem: Self Care Deficits Care Plan (Adult)  Goal: *Acute Goals and Plan of Care (Insert Text)  Description  FUNCTIONAL STATUS PRIOR TO ADMISSION: Patient was independent and active without use of DME. Patient was independent for basic and instrumental ADLs. HOME SUPPORT: The patient lived with 2 sons but did not require assist. Both work, but pt would only be alone for about 3 hours in the afternoon. Occupational Therapy Goals  Initiated 12/12/2019    1. Patient will perform lower body dressing with supervision/set-up within 7 days. 2.  Patient will perform toilet transfer with modified independence using most appropriate DME within 7 days. 3.  Patient will perform all aspects of toileting at supervision/set-up within 7 days. 4.  Patient will don/doff back brace at minimal assistance within 7 days. 5.  Patient will verbalize/demonstrate 3/3 back precautions during ADL tasks without cues within 7 days. Outcome: Progressing Towards Goal    OCCUPATIONAL THERAPY TREATMENT  Patient: Erica Marquez (43 y.o. female)  Date: 12/14/2019  Diagnosis: Lumbar spinal stenosis [M48.061]  Lumbar stenosis [M48.061]   <principal problem not specified>  Procedure(s) (LRB):  LUMBAR LAMINECTOMY L2-S1, POSTERIOR SPINAL FUSION L3-S1 (N/A) 3 Days Post-Op  Precautions:    Chart, occupational therapy assessment, plan of care, and goals were reviewed. ASSESSMENT  Patient continues with skilled OT services and is progressing towards goals. Pt received exiting bathroom with RW, no c/o dizziness. Assisted with LB dressing with supervision and cues for ease of technique. Mod A in standing to don TLSO with most assist needed to manage R strapping. Supervision for sit <> stand, occasional verbal cues for proper hand placement with RW. VSS post activity 121/75. Reviewed benefits of AE with pt reporting she has reacher and toilet tongs at home from previous spinal sx.      Current Level of Function Impacting Discharge (ADLs): Supervision transfers, ADLs    Other factors to consider for discharge: will be home alone at times         PLAN :  Patient continues to benefit from skilled intervention to address the above impairments. Continue treatment per established plan of care. to address goals. Recommend with staff: OOB to chair, bathroom with RW A x 1    Recommend next OT session: review use of reacher for LB    Recommendation for discharge: (in order for the patient to meet his/her long term goals)  No skilled occupational therapy/ follow up rehabilitation needs identified at this time. This discharge recommendation:  Has not yet been discussed the attending provider and/or case management    IF patient discharges home will need the following DME: none       SUBJECTIVE:   Patient stated I need to find my long handled shoe horn.     OBJECTIVE DATA SUMMARY:   Cognitive/Behavioral Status:                      Functional Mobility and Transfers for ADLs:  Bed Mobility:   OOB    Transfers:  Sit to Stand: Supervision  Functional Transfers  Toilet Transfer : Modified independent       Balance:  Sitting: Intact  Standing: Intact  Standing - Static: Good  Standing - Dynamic : Good    ADL Intervention:                      Upper Body Dressing Assistance  Orthotics(Brace): Moderate assistance(TLSO in standing)    Lower Body Dressing Assistance  Pants With Elastic Waist: Supervision(cues to don R LE first)           Activity Tolerance:   Good  Please refer to the flowsheet for vital signs taken during this treatment.     After treatment patient left in no apparent distress:   Sitting in chair and Call bell within reach    COMMUNICATION/COLLABORATION:   The patients plan of care was discussed with: Physical Therapist and Registered Nurse    Millie Kruger OT  Time Calculation: 22 mins

## 2019-12-14 NOTE — PROGRESS NOTES
Problem: Mobility Impaired (Adult and Pediatric)  Goal: *Acute Goals and Plan of Care (Insert Text)  Description  FUNCTIONAL STATUS PRIOR TO ADMISSION: Patient was independent and active without use of DME.    HOME SUPPORT PRIOR TO ADMISSION: The patient lived with family but did not require assist.    Physical Therapy Goals  Initiated 12/12/2019  1. Patient will move from supine to sit and sit to supine , scoot up and down and roll side to side in bed with modified independence within 7 day(s). 2.  Patient will transfer from bed to chair and chair to bed with modified independence using the least restrictive device within 7 day(s). 3.  Patient will perform sit to stand with modified independence within 7 day(s). 4.  Patient will ambulate with modified independence for 300 feet with the least restrictive device within 7 day(s). 5.  Patient will ascend/descend 4 stairs with 1 handrail(s) with supervision/set-up within 7 day(s). Outcome: Progressing Towards Goal  PHYSICAL THERAPY TREATMENT  Patient: Florecita Castro (47 y.o. female)  Date: 12/14/2019  Diagnosis: Lumbar spinal stenosis [M48.061]  Lumbar stenosis [M48.061]   <principal problem not specified>  Procedure(s) (LRB):  LUMBAR LAMINECTOMY L2-S1, POSTERIOR SPINAL FUSION L3-S1 (N/A) 3 Days Post-Op  Precautions:   No bending, no lifting greater than 5 lbs, no twisting, log-roll technique, repositioning every 20-30 min except when sleeping, brace when OOB (if ordered)  Chart, physical therapy assessment, plan of care and goals were reviewed. ASSESSMENT  Patient continues with skilled PT services and is progressing towards goals. Pt reports feeling much better - no dizziness today and pain well controlled. Patient independent with back precautions and use of TLSO. Pt safe and independent with functional transfers/amb with RW. Patient safe and independent on stairs. .     Current Level of Function Impacting Discharge (mobility/balance): supervision for transfers/ standby guard up/down 4 steps    Other factors to consider for discharge: lives with supportive family         PLAN :  Patient continues to benefit from skilled intervention to address the above impairments. Continue treatment per established plan of care. to address goals. Recommendation for discharge: (in order for the patient to meet his/her long term goals)  Physical therapy at least 2 days/week in the home     This discharge recommendation:  Has been made in collaboration with the attending provider and/or case management    IF patient discharges home will need the following DME: patient owns DME required for discharge       SUBJECTIVE:   Patient stated I think I can go home today - I feel much better.     OBJECTIVE DATA SUMMARY:   Critical Behavior:  Neurologic State: Alert  Orientation Level: Oriented X4  Cognition: Follows commands  Safety/Judgement: Awareness of environment, Fall prevention, Good awareness of safety precautions, Home safety, Insight into deficits    Spinal diagnosis intervention:  The patient stated 3/3 back precautions when prompted. Reviewed all 3 back precautions, log roll technique, and sitting for 30 minutes at a time. Reviewed back brace application and wear schedule. Functional Mobility Training:    Bed Mobility:  Log  - pt able to verbally recall - pt up in bedside chair when therapist arrived. Transfers:  Sit to Stand: Supervision  Stand to Sit: Supervision        Bed to Chair: Supervision                    Balance:  Sitting: Intact  Standing: Intact; With support  Standing - Static: Good;Constant support  Standing - Dynamic : Good;Constant support  Ambulation/Gait Training:  Distance (ft): 225 Feet (ft)  Assistive Device: Brace/Splint;Gait belt  Ambulation - Level of Assistance: Stand-by assistance        Gait Abnormalities: Decreased step clearance        Base of Support: Widened     Speed/Bettina: Slow  Step Length: Right shortened;Left shortened                  Pain Rating:  Pt rated back pain 5/10 pre and post treatment. Activity Tolerance:   Fair  Please refer to the flowsheet for vital signs taken during this treatment.     After treatment patient left in no apparent distress:   Sitting in chair and Call bell within reach    COMMUNICATION/COLLABORATION:   The patients plan of care was discussed with: Registered Nurse    Iris Driscoll, PT   Time Calculation: 30 mins

## 2019-12-17 NOTE — DISCHARGE SUMMARY
[address] [place] [address]  [address] [facility], [address] [place] [address] SUMMARY     Patient: Sandra Moreno                             Medical Record Number: [id number]                : 1960  Age: 62 y.o. Admit Date: 2019  Discharge Date: 2019  Admission Diagnosis: Lumbar spinal stenosis [M48.061]  Lumbar stenosis [M48.061]  Discharge Diagnosis: SEVERE SPINAL STENOSIS L2-S1  Procedures: Procedure(s):  LUMBAR LAMINECTOMY L2-S1, POSTERIOR SPINAL FUSION L3-S1  Surgeon:WOODY Velazquez MD  Anesthesia: general  Complications: None       Hospital Course:  Sadnra Moreno was admitted the same day of surgery and underwent Procedure(s):  LUMBAR LAMINECTOMY L2-S1, POSTERIOR SPINAL FUSION L3-S1 and tolerated the procedure well. She was transferred  to the recovery room in stable condition. After a brief stay the patient was then transferred to the Spine Surgery Unit at [address] [place] [address].  On postoperative day #1, the dressing was clean and dry, she was neurovascularly intact. The patient was afebrile and vital signs were stable. Calves were soft and non-tender bilaterally. On postoperative day  # 2, the patient was tolerating a regular diet and making satisfactory progress with physical therapy. Hemoglobin and INR prior to discharge were   Lab Results   Component Value Date/Time    HGB 7.5 (L) 2019 05:16 AM    INR 1.0 2019 09:22 AM   .  Sandra Moreno made satisfactory progress with physical therapy and was discharged to Home in stable condition on postoperative day 3. She was provided with routine postoperative instructions and advised to follow up in my office in 2 weeks following discharge from the hospital.  She was prescribed pain medication for post operative analgesia. Discharge Medications:  Cannot display discharge medications since this patient is not currently admitted.       Signed by: Jose Roberto Funes MD  2019

## 2021-02-17 ENCOUNTER — TRANSCRIBE ORDER (OUTPATIENT)
Dept: SCHEDULING | Age: 61
End: 2021-02-17

## 2021-02-17 DIAGNOSIS — Z12.31 VISIT FOR SCREENING MAMMOGRAM: Primary | ICD-10-CM

## 2021-04-22 ENCOUNTER — HOSPITAL ENCOUNTER (OUTPATIENT)
Dept: MAMMOGRAPHY | Age: 61
Discharge: HOME | End: 2021-04-22
Attending: FAMILY MEDICINE
Payer: MEDICAID

## 2021-04-22 DIAGNOSIS — Z12.31 VISIT FOR SCREENING MAMMOGRAM: ICD-10-CM

## 2021-04-22 PROCEDURE — 77063 BREAST TOMOSYNTHESIS BI: CPT

## 2021-05-14 ENCOUNTER — HOSPITAL ENCOUNTER (OUTPATIENT)
Dept: PREADMISSION TESTING | Age: 61
Discharge: HOME | End: 2021-05-14

## 2021-10-08 ENCOUNTER — HOSPITAL ENCOUNTER (OUTPATIENT)
Dept: PREADMISSION TESTING | Age: 61
Discharge: HOME | End: 2021-10-08
Payer: MEDICAID

## 2021-10-08 PROCEDURE — U0005 INFEC AGEN DETEC AMPLI PROBE: HCPCS

## 2021-10-10 LAB
SARS-COV-2, XPLCVT: NOT DETECTED
SOURCE, COVRS: NORMAL

## 2021-10-12 ENCOUNTER — ANESTHESIA EVENT (OUTPATIENT)
Dept: ENDOSCOPY | Age: 61
End: 2021-10-12
Payer: MEDICAID

## 2021-10-12 RX ORDER — AMLODIPINE BESYLATE 10 MG/1
10 TABLET ORAL DAILY
COMMUNITY

## 2021-10-12 RX ORDER — DICLOFENAC SODIUM 75 MG/1
75 TABLET, DELAYED RELEASE ORAL 2 TIMES DAILY
COMMUNITY

## 2021-10-13 ENCOUNTER — ANESTHESIA (OUTPATIENT)
Dept: ENDOSCOPY | Age: 61
End: 2021-10-13
Payer: MEDICAID

## 2021-10-13 ENCOUNTER — HOSPITAL ENCOUNTER (OUTPATIENT)
Age: 61
Setting detail: OUTPATIENT SURGERY
Discharge: HOME | End: 2021-10-13
Attending: INTERNAL MEDICINE | Admitting: INTERNAL MEDICINE
Payer: MEDICAID

## 2021-10-13 VITALS
WEIGHT: 145.5 LBS | HEART RATE: 58 BPM | RESPIRATION RATE: 14 BRPM | HEIGHT: 65 IN | OXYGEN SATURATION: 99 % | TEMPERATURE: 98.3 F | SYSTOLIC BLOOD PRESSURE: 118 MMHG | BODY MASS INDEX: 24.24 KG/M2 | DIASTOLIC BLOOD PRESSURE: 72 MMHG

## 2021-10-13 PROCEDURE — 74011000250 HC RX REV CODE- 250: Performed by: ANESTHESIOLOGY

## 2021-10-13 PROCEDURE — 2709999900 HC NON-CHARGEABLE SUPPLY: Performed by: INTERNAL MEDICINE

## 2021-10-13 PROCEDURE — 76060000031 HC ANESTHESIA FIRST 0.5 HR: Performed by: INTERNAL MEDICINE

## 2021-10-13 PROCEDURE — 74011250636 HC RX REV CODE- 250/636: Performed by: ANESTHESIOLOGY

## 2021-10-13 PROCEDURE — 76040000019: Performed by: INTERNAL MEDICINE

## 2021-10-13 PROCEDURE — 74011250636 HC RX REV CODE- 250/636: Performed by: INTERNAL MEDICINE

## 2021-10-13 RX ORDER — NALOXONE HYDROCHLORIDE 0.4 MG/ML
0.4 INJECTION, SOLUTION INTRAMUSCULAR; INTRAVENOUS; SUBCUTANEOUS
Status: DISCONTINUED | OUTPATIENT
Start: 2021-10-13 | End: 2021-10-13 | Stop reason: HOSPADM

## 2021-10-13 RX ORDER — FLUMAZENIL 0.1 MG/ML
0.2 INJECTION INTRAVENOUS
Status: DISCONTINUED | OUTPATIENT
Start: 2021-10-13 | End: 2021-10-13 | Stop reason: HOSPADM

## 2021-10-13 RX ORDER — SODIUM CHLORIDE 0.9 % (FLUSH) 0.9 %
5-40 SYRINGE (ML) INJECTION AS NEEDED
Status: DISCONTINUED | OUTPATIENT
Start: 2021-10-13 | End: 2021-10-13 | Stop reason: HOSPADM

## 2021-10-13 RX ORDER — SODIUM CHLORIDE 9 MG/ML
100 INJECTION, SOLUTION INTRAVENOUS CONTINUOUS
Status: DISCONTINUED | OUTPATIENT
Start: 2021-10-13 | End: 2021-10-13 | Stop reason: HOSPADM

## 2021-10-13 RX ORDER — SODIUM CHLORIDE 0.9 % (FLUSH) 0.9 %
5-40 SYRINGE (ML) INJECTION EVERY 8 HOURS
Status: DISCONTINUED | OUTPATIENT
Start: 2021-10-13 | End: 2021-10-13 | Stop reason: HOSPADM

## 2021-10-13 RX ORDER — DEXTROMETHORPHAN/PSEUDOEPHED 2.5-7.5/.8
1.2 DROPS ORAL
Status: DISCONTINUED | OUTPATIENT
Start: 2021-10-13 | End: 2021-10-13 | Stop reason: HOSPADM

## 2021-10-13 RX ORDER — PROPOFOL 10 MG/ML
INJECTION, EMULSION INTRAVENOUS AS NEEDED
Status: DISCONTINUED | OUTPATIENT
Start: 2021-10-13 | End: 2021-10-13 | Stop reason: HOSPADM

## 2021-10-13 RX ORDER — ATROPINE SULFATE 0.1 MG/ML
0.5 INJECTION INTRAVENOUS
Status: DISCONTINUED | OUTPATIENT
Start: 2021-10-13 | End: 2021-10-13 | Stop reason: HOSPADM

## 2021-10-13 RX ORDER — LIDOCAINE HYDROCHLORIDE 20 MG/ML
INJECTION, SOLUTION EPIDURAL; INFILTRATION; INTRACAUDAL; PERINEURAL AS NEEDED
Status: DISCONTINUED | OUTPATIENT
Start: 2021-10-13 | End: 2021-10-13 | Stop reason: HOSPADM

## 2021-10-13 RX ADMIN — PROPOFOL 150 MG: 10 INJECTION, EMULSION INTRAVENOUS at 10:05

## 2021-10-13 RX ADMIN — LIDOCAINE HYDROCHLORIDE 40 MG: 20 INJECTION, SOLUTION EPIDURAL; INFILTRATION; INTRACAUDAL; PERINEURAL at 09:52

## 2021-10-13 RX ADMIN — SODIUM CHLORIDE 100 ML/HR: 9 INJECTION, SOLUTION INTRAVENOUS at 09:04

## 2021-10-13 NOTE — PROGRESS NOTES
Endoscope was pre-cleaned at the bedside immediately following procedure by JANETTE Tomlin. Anesthesia reports 150mg Propofol, 40mg Lidocaine and 200mL NS given during procedure. Received report from anesthesia staff on vital signs and status of patient.

## 2021-10-13 NOTE — ANESTHESIA POSTPROCEDURE EVALUATION
Procedure(s):  COLONOSCOPY.    total IV anesthesia    Anesthesia Post Evaluation        Patient location during evaluation: PACU  Note status: Adequate. Level of consciousness: responsive to verbal stimuli and sleepy but conscious  Pain management: satisfactory to patient  Airway patency: patent  Anesthetic complications: no  Cardiovascular status: acceptable  Respiratory status: acceptable  Hydration status: acceptable  Comments: +Post-Anesthesia Evaluation and Assessment    Patient: Kristi Rueda MRN: [id number]  SSN: [id number]   YOB: 1960  Age: 59 y.o. Sex: female      Cardiovascular Function/Vital Signs    /64   Pulse (!) 56   Temp 36.8 °C (98.3 °F)   Resp 18   Ht 5' 5\" (1.651 m)   Wt 66 kg (145 lb 8 oz)   SpO2 100%   BMI 24.21 kg/m²     Patient is status post Procedure(s):  COLONOSCOPY. Nausea/Vomiting: Controlled. Postoperative hydration reviewed and adequate. Pain:  Pain Scale 1: Visual (10/13/21 1010)  Pain Intensity 1: 0 (10/13/21 1010)   Managed. Neurological Status: At baseline. Mental Status and Level of Consciousness: Arousable. Pulmonary Status:   O2 Device: CO2 nasal cannula (10/13/21 1006)   Adequate oxygenation and airway patent. Complications related to anesthesia: None    Post-anesthesia assessment completed. No concerns.     Signed By: Jaret Caballero MD    10/13/2021  Post anesthesia nausea and vomiting:  controlled      INITIAL Post-op Vital signs:   Vitals Value Taken Time   /64 10/13/21 1010   Temp     Pulse 56 10/13/21 1010   Resp 18 10/13/21 1010   SpO2 100 % 10/13/21 1010

## 2021-10-13 NOTE — PROCEDURES
[facility]                  Colonoscopy Operative Report    10/13/2021      Boby Pinto  [id number]  1960    Procedure Type:   Colonoscopy --screening     Indications:    Family history of coloretal cancer (screening only), Family history of coloretal adenoma  (screening only)     Pre-operative Diagnosis: see indication above    Post-operative Diagnosis:  See findings below    :  Cooper Avilez MD    Referring Provider: Anila Miramontes MD      Sedation:  MAC anesthesia Propofol    Pre-Procedural Exam:      Airway: clear,  No airway problems anticipated  Heart: RRR, without gallops or rubs  Lungs: clear bilaterally without wheezes, crackles, or rhonchi  Abdomen: soft, nontender, nondistended, bowel sounds present  Mental Status: awake, alert and oriented to person, place and time     Procedure Details:  After informed consent was obtained with all risks and benefits of procedure explained and preoperative exam completed, the patient was taken to the endoscopy suite and placed in the left lateral decubitus position. Upon sequential sedation as per above, a digital rectal exam was performed . The Olympus videocolonoscope  was inserted in the rectum and carefully advanced to the cecum, which was identified by the ileocecal valve. The cecum was identified by the ileocecal valve and appendiceal orifice. The quality of preparation was good. The colonoscope was slowly withdrawn with careful evaluation between folds. Retroflexion in the rectum was completed demonstrating internal hemorrhoids. Findings:   Rectum: Grade 1 internal hemorrhoid(s); Sigmoid:     - Diverticulosis  Descending Colon: normal  Transverse Colon: normal  Ascending Colon: normal  Cecum: normal  Terminal Ileum: not intubated      Specimen Removed:  none    Complications: None. EBL:  None.     Impression:    diverticulosis,  Mild in degree, involving the sigmoid  hemorrhoids internal, Moderate in size    Recommendations: --Repeat colonoscopy in 5 years. High fiber diet. Resume normal medication(s). Will arrange rubber band ligation of the hemorrhoids. Discharge Disposition:  Home in the company of a  when able to ambulate. Vidya Mckinney MD    10/13/2021     SULTANA Willoughby MD  [facility], [address] [place] [address]  [phone]  [email or web address]

## 2021-10-13 NOTE — ANESTHESIA PREPROCEDURE EVALUATION
Relevant Problems   No relevant active problems       Anesthetic History          Comments: HARD TO WAKE UP AFTER ANESTHESIA X 1     Review of Systems / Medical History  Patient summary reviewed, nursing notes reviewed and pertinent labs reviewed    Pulmonary    COPD      Smoker      Comments: Current Every Day Smoker - 40 pack years   Neuro/Psych       CVA  TIA and headaches    Comments: Lumbar spinal stenosis Cardiovascular    Hypertension: well controlled              Exercise tolerance: >4 METS     GI/Hepatic/Renal     GERD      PUD    Comments: Idiopathic hematuria Endo/Other        Arthritis     Other Findings              Physical Exam    Airway  Mallampati: I  TM Distance: > 6 cm  Neck ROM: normal range of motion   Mouth opening: Normal     Cardiovascular  Regular rate and rhythm,  S1 and S2 normal,  no murmur, click, rub, or gallop             Dental    Dentition: Full upper dentures     Pulmonary  Breath sounds clear to auscultation               Abdominal  GI exam deferred       Other Findings            Anesthetic Plan    ASA: 3  Anesthesia type: total IV anesthesia          Induction: Intravenous  Anesthetic plan and risks discussed with: Patient

## 2021-10-13 NOTE — H&P
Alonzo Bhagat MD  [facility], [facility]  [phone]  [email or web address]    Gastroenterology Outpatient History and Physical    Patient: Peggy Glynn    Physician: Eric Mckinney MD    Vital Signs: Blood pressure (!) 142/73, pulse 63, temperature 98.3 °F (36.8 °C), resp. rate 17, height 5' 5\" (1.651 m), weight 66 kg (145 lb 8 oz), SpO2 99 %. Allergies: Allergies   Allergen Reactions    Shellfish Derived Angioedema     CAN EAT CLAMS, SHRIMP AND OYSTERS, BUT NO CRAB NO LOBSTER     Iodine Swelling    Codeine Nausea Only       Chief Complaint: Screening colonoscopy    History of Present Illness: Here for a screening colonoscopy. Last colonoscopy was about 10 years ago and showed no polyps. Currently has no GI symptoms. Positive FH of colon cancer and polyps.     History:  Past Medical History:   Diagnosis Date    Adverse effect of anesthesia     HARD TO WAKE UP AFTER ANESTHESIA X 1    Arthritis     back and hips    Chronic obstructive pulmonary disease (HCC)     no medications    GERD (gastroesophageal reflux disease)     Heart murmur     History of shingles     Hypertension     Idiopathic hematuria 12/4/2019    Menopause     Migraines     PUD (peptic ulcer disease)     TIA (transient ischemic attack) 1991    NO DEFICITS    White coat syndrome with diagnosis of hypertension       Past Surgical History:   Procedure Laterality Date    HX CHOLECYSTECTOMY      HX HYSTERECTOMY      HX LUMBAR FUSION  12/2019    HX OTHER SURGICAL      THORACIC FUSIONS X 2     HX THORACIC LAMINECTOMY  2005, 2015    fusion x 2      Social History     Socioeconomic History    Marital status:      Spouse name: Not on file    Number of children: Not on file    Years of education: Not on file    Highest education level: Not on file   Tobacco Use    Smoking status: Current Every Day Smoker Packs/day: 1.00     Years: 40.00     Pack years: 40.00    Smokeless tobacco: Never Used   Vaping Use    Vaping Use: Never used   Substance and Sexual Activity    Alcohol use: Not Currently     Comment: SOCIAL     Drug use: No    Sexual activity: Never     Social Determinants of Health     Financial Resource Strain:     Difficulty of Paying Living Expenses:    Food Insecurity:     Worried About Running Out of Food in the Last Year:     Ran Out of Food in the Last Year:    Transportation Needs:     Lack of Transportation (Medical):  Lack of Transportation (Non-Medical):    Physical Activity:     Days of Exercise per Week:     Minutes of Exercise per Session:    Stress:     Feeling of Stress :    Social Connections:     Frequency of Communication with Friends and Family:     Frequency of Social Gatherings with Friends and Family:     Attends Judaism Services:     Active Member of Clubs or Organizations:     Attends Club or Organization Meetings:     Marital Status:       Family History   Problem Relation Age of Onset    Heart Disease Mother     Asthma Mother     Hypertension Mother     Cancer Father         STOMACH CANCER    Diabetes Sister     High Cholesterol Sister     Obesity Sister     Hypertension Sister     Diabetes Sister     Anemia Sister     Arthritis-osteo Sister     Anesth Problems Neg Hx       Patient Active Problem List   Diagnosis Code    Idiopathic hematuria N02.9    Lumbar spinal stenosis M48.061    Lumbar stenosis M48.061       Medications:   Prior to Admission medications    Medication Sig Start Date End Date Taking? Authorizing Provider   amLODIPine (NORVASC) 10 mg tablet Take 10 mg by mouth daily. Yes Provider, Historical   diclofenac EC (VOLTAREN) 75 mg EC tablet Take 75 mg by mouth two (2) times a day. Yes Provider, Historical   senna-docusate (PERICOLACE) 8.6-50 mg per tablet Take 1 Tab by mouth two (2) times a day.  12/13/19  Yes Kishore Pacheco PA-C SUMAtriptan (IMITREX) 100 mg tablet Take 100 mg by mouth once as needed for Migraine. Yes Provider, Historical   cholecalciferol, vitamin D3, (VITAMIN D3) 2,000 unit tab Take  by mouth daily. Yes Provider, Historical   atorvastatin (LIPITOR) 10 mg tablet Take 10 mg by mouth daily. Indications: high cholesterol   Yes Other, MD Laura   lisinopril (PRINIVIL, ZESTRIL) 20 mg tablet Take 20 mg by mouth daily. Yes Other, MD Laura       Physical Exam:     General: well developed, well nourished   HEENT: unremarkable   Heart: regular rhythm no mumur    Lungs: clear   Abdominal:  benign   Neurological: unremarkable   Extremities: no edema     Findings/Diagnosis: Screening colonoscopy.  FH of both colon cancer and polyps    Plan of Care/Planned Procedure: Colonoscopy with monitored anesthesia care sedation    Signed:  Catrachito Powell MD 10/13/2021

## 2021-10-13 NOTE — ROUTINE PROCESS
Jim Massed  1960  [id number]    Situation:  Verbal report received from: sedrick  Procedure: Procedure(s):  COLONOSCOPY    Background:    Preoperative diagnosis: FAMILY  HX OF COLON CANCER  Postoperative diagnosis: Diverticulosis, hemorrhoids    :  Dr. Lori Bullard  Assistant(s): Endoscopy Technician-1: Hammad Diamond  Endoscopy RN-1: Gracie Boykin RN    Specimens: * No specimens in log *  H. Pylori  no    Assessment:  Intra-procedure medications   Intravenous fluids: NS@ KVO     Vital signs stable     Abdominal assessment: round and soft     Recommendation:  Discharge patient per MD order.   Return to floor  Family or Friend   Permission to share finding with family or friend yes

## 2021-10-13 NOTE — DISCHARGE INSTRUCTIONS
Wilmer Kingsley MD  [facility], [address]  [address]  [phone]  [email or web address]    Boby Pinto  [id number]  1960    COLON DISCHARGE INSTRUCTIONS  Discomfort:  Redness at IV site- apply warm compress to area; if redness or soreness persist- contact your physician  There may be a slight amount of blood passed from the rectum  Gaseous discomfort- walking, belching will help relieve any discomfort  You may not operate a vehicle for 12 hours  You may not engage in an occupation involving machinery or appliances for rest of today  You may not drink alcoholic beverages for at least 12 hours  Avoid making any critical decisions for at least 24 hour  DIET:   High fiber diet. - however -  remember your colon is empty and a heavy meal will produce gas. Avoid these foods:  vegetables, fried / greasy foods, carbonated drinks for today      ACTIVITY:  You may resume your normal daily activities it is recommended that you spend the remainder of the day resting -  avoid any strenuous activity. CALL M.D. ANY SIGN OF:   Increasing pain, nausea, vomiting  Abdominal distension (swelling)  New increased bleeding (oral or rectal)  Fever (chills)  Pain in chest area  Bloody discharge from nose or mouth  Shortness of breath     COLONOSCOPY FINDINGS:  Your colonoscopy showed: mild diverticulosis and medium sized internal hemorrhoids. There are also external skin tags but not hemorrhoids. Follow-up Instructions:   Call Dr. Wilmer Kingsley if any questions or problems. Telephone # [phone]  Dr. Krissy Flores office will contact you to arrange for rubber band ligation of the internal hemorrhoids. Should have a repeat colonoscopy in 5 years.

## 2022-01-17 NOTE — PROGRESS NOTES
New Patient, Referred by Dr. Celia Jackson for ovarian mass, pt reports a 50 pound weight loss since 12/2019 that has been unintentional, she reports she cannot eat, \"I feel like everything is in my chest\", she has stomach cramping and constipation, positive for nausea and vomiting, pt states she does experience sever sweats at times    1. Have you been to the ER, urgent care clinic since your last visit? Hospitalized since your last visit?  no    2. Have you seen or consulted any other health care providers outside of the [address] [place] [address] since your last visit? Include any pap smears or colon screening.    Yes, Dr. Yael Ty

## 2022-01-18 ENCOUNTER — OFFICE VISIT (OUTPATIENT)
Dept: GYNECOLOGY | Age: 62
End: 2022-01-18
Payer: MEDICAID

## 2022-01-18 VITALS
BODY MASS INDEX: 21.73 KG/M2 | HEIGHT: 65 IN | WEIGHT: 130.4 LBS | SYSTOLIC BLOOD PRESSURE: 111 MMHG | DIASTOLIC BLOOD PRESSURE: 66 MMHG | HEART RATE: 76 BPM

## 2022-01-18 DIAGNOSIS — R19.00 PELVIC MASS: Primary | ICD-10-CM

## 2022-01-18 DIAGNOSIS — N83.8 OVARIAN MASS: ICD-10-CM

## 2022-01-18 DIAGNOSIS — N83.8 OVARIAN MASS, LEFT: ICD-10-CM

## 2022-01-18 PROCEDURE — 99204 OFFICE O/P NEW MOD 45 MIN: CPT | Performed by: OBSTETRICS & GYNECOLOGY

## 2022-01-18 RX ORDER — TRAMADOL HYDROCHLORIDE 50 MG/1
TABLET ORAL
COMMUNITY
Start: 2021-12-22

## 2022-01-18 NOTE — LETTER
1/24/2022    Patient: Flaco Hayden   YOB: 1960   Date of Visit: 1/18/2022     Duane Faith, MD  [address]  [address] [facility] [address]  Via Fax: [phone]    Dear Duane Faith, MD,      Thank you for referring Ms. Gladys Gruber to [facility] for evaluation. My notes for this consultation are attached. If you have questions, please do not hesitate to call me. I look forward to following your patient along with you.       Sincerely,    Kimberly Herrera MD

## 2022-01-24 PROBLEM — N83.8 OVARIAN MASS, LEFT: Status: ACTIVE | Noted: 2022-01-24

## 2022-01-24 NOTE — PROGRESS NOTES
[facility] [address]  P [phone]  F [phone]    Office Note  Patient ID:  Name:  Vivian Berkowitz  MRN:  [id number]  :  1960/61 y.o. Date:  2022      HISTORY OF PRESENT ILLNESS:  Ms. Vivian Berkowitz is a 59 y.o. postmenopausal female who presents as a new patient from Dr. Nida Raines for a pelvic mass. The patient was admitted to [address] [place] [address] in November for what is reported as shaking and hypotension. The patient reports a history of chronic back pain. She reports that she was at a family gathering when she just was not feeling well, nauseous, and vomited. Denies CP or SOB at that time. CT A/P on 2021 during her workup noted a \"question left ovarian lesion with calcification. \" The patient was referred for this reason. The patient is status post a hysterectomy. The patient has a chronic history of constipation and diarrhea. Imaging Review:   CT A/P 2021:  Impression:   1. Findings are suspicious for a left paraduodenal hernia. No evidence of bowel obstruction at this time. Visualization of the bowel is limited due to lack of contrast and extensive artifact from lumbar surgical spinal fusion. 2. Non-obstructing right nephrolithiasis. 3. Question left ovarian lesion with calcification. If the patient is not status post oophorectomy, short-term follow-up with pelvic ultrasound should be considered for further characterization. ROS:  A comprehensive review of systems was negative except for that written in the History of Present Illness.  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    TIA (transient ischemic attack) 1991     PMH:  Past Medical History:   Diagnosis Date    Adverse effect of anesthesia     HARD TO WAKE UP AFTER ANESTHESIA X 1    Arthritis     back and hips    Chronic obstructive pulmonary disease (HCC)     no medications    GERD (gastroesophageal reflux disease)     Heart murmur     History of shingles     Hypertension     Idiopathic hematuria 12/4/2019    Menopause     Migraines     PUD (peptic ulcer disease)     TIA (transient ischemic attack) 1991    NO DEFICITS    White coat syndrome with diagnosis of hypertension       PSH:  Past Surgical History:   Procedure Laterality Date    COLONOSCOPY N/A 10/13/2021    [facility] at [facility] ENDOSCOPY    COLORECTAL SCRN; HI RISK IND  10/13/2021         HX CHOLECYSTECTOMY      HX HYSTERECTOMY      HX LUMBAR FUSION  12/2019    HX OTHER SURGICAL      THORACIC FUSIONS X 2     HX THORACIC LAMINECTOMY  2005, 2015    fusion x 2      Social History:  Social History     Tobacco Use    Smoking status: Current Every Day Smoker     Packs/day: 1.00     Years: 40.00     Pack years: 40.00    Smokeless tobacco: Never Used   Substance Use Topics    Alcohol use: Not Currently     Comment: SOCIAL       Family History:  Family History   Problem Relation Age of Onset    Heart Disease Mother     Asthma Mother    Toth Hypertension Mother     Cancer Father         STOMACH CANCER    Diabetes Sister     High Cholesterol Sister     Obesity Sister     Hypertension Sister     Diabetes Sister     Anemia Sister     OSTEOARTHRITIS Sister     Anesth Problems Neg Hx       Medications: (reviewed)  Current Outpatient Medications   Medication Sig    amLODIPine (NORVASC) 10 mg tablet Take 10 mg by mouth daily.  SUMAtriptan (IMITREX) 100 mg tablet Take 100 mg by mouth once as needed for Migraine.     cholecalciferol, vitamin D3, (VITAMIN D3) 2,000 unit tab Take  by mouth daily.  atorvastatin (LIPITOR) 10 mg tablet Take 10 mg by mouth daily. Indications: high cholesterol    lisinopril (PRINIVIL, ZESTRIL) 20 mg tablet Take 20 mg by mouth daily.  traMADoL (ULTRAM) 50 mg tablet     diclofenac EC (VOLTAREN) 75 mg EC tablet Take 75 mg by mouth two (2) times a day.  senna-docusate (PERICOLACE) 8.6-50 mg per tablet Take 1 Tab by mouth two (2) times a day. No current facility-administered medications for this visit. Allergies: (reviewed)  Allergies   Allergen Reactions    Shellfish Derived Angioedema     CAN EAT CLAMS, SHRIMP AND OYSTERS, BUT NO CRAB NO LOBSTER     Iodine Swelling    Codeine Nausea Only        OBJECTIVE:    Physical Exam:  VITAL SIGNS: Vitals:    01/18/22 1041   BP: 111/66   Pulse: 76   Weight: 130 lb 6.4 oz (59.1 kg)   Height: 5' 5\" (1.651 m)     Body mass index is 21.7 kg/m². GENERAL ENEDINA: Conversant, alert, oriented. No acute distress. HEENT: HEENT. No thyroid enlargement. No JVD. Neck: Supple without restrictions. RESPIRATORY: Clear to auscultation and percussion to the bases. No CVAT. CARDIOVASC: RRR without murmur/rub. GASTROINT: soft, non-tender, without masses or organomegaly   MUSCULOSKEL: no joint tenderness, deformity or swelling       EXTREMITIES: extremities normal, atraumatic, no cyanosis or edema   PELVIC: Exam chaperoned by nurse. Normal appearing external genitalia. On speculum exam, atrophic appearing vagina. Surgically absent cervix and uterus. On bimanual exam, surgically absent uterus and cervix. No evidence of adnexal masses or nodularity. RECTAL: deferred   DAVID SURVEY: No suspicious lymphadenopathy or edema noted. NEURO: Grossly intact. No acute deficit.        Lab Date as available:    Lab Results   Component Value Date/Time    WBC 6.5 11/27/2019 09:22 AM    HGB 7.5 (L) 12/13/2019 05:16 AM    HCT 37.5 11/27/2019 09:22 AM    PLATELET 897 30/81/8064 09:22 AM MCV 85.4 11/27/2019 09:22 AM     Lab Results   Component Value Date/Time    Sodium 142 12/12/2019 03:06 AM    Potassium 4.2 12/12/2019 03:06 AM    Chloride 112 (H) 12/12/2019 03:06 AM    CO2 26 12/12/2019 03:06 AM    Anion gap 4 (L) 12/12/2019 03:06 AM    Glucose 117 (H) 12/12/2019 03:06 AM    BUN 27 (H) 12/12/2019 03:06 AM    Creatinine 1.00 12/12/2019 03:06 AM    BUN/Creatinine ratio 27 (H) 12/12/2019 03:06 AM    GFR est AA >60 12/12/2019 03:06 AM    GFR est non-AA 56 (L) 12/12/2019 03:06 AM    Calcium 8.2 (L) 12/12/2019 03:06 AM         IMPRESSION/PLAN:    Ms. Dodie Good is a 59 y.o. female with a working diagnosis of \"question left ovarian lesion with calcification. 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 Reid De Guzman's course to date, including her medical records, recent studies, physical exam, and review of symptoms. I have personally reviewed the images, and the \"question left ovarian lesion with calcification\" appears to be a normal ovary. Her exam is completely normal. She is asymptomatic. I have recommended a pelvic ultrasound for further evaluation of the left ovary. Will have patient return to clinic to discuss results and further management. All questions and concerns were addressed with the patient and she is comfortable with the plan.      Defined Sensitive Document    >50% of total time allocated to visit dedicated to counseling, 50 minutes total.    Signed By: Travis Tierney MD     1/24/2022/8:26 AM

## 2022-01-26 ENCOUNTER — HOSPITAL ENCOUNTER (OUTPATIENT)
Dept: ULTRASOUND IMAGING | Age: 62
Discharge: HOME | End: 2022-01-26
Attending: OBSTETRICS & GYNECOLOGY
Payer: MEDICAID

## 2022-01-26 DIAGNOSIS — R19.00 PELVIC MASS: ICD-10-CM

## 2022-01-26 DIAGNOSIS — N83.8 OVARIAN MASS: ICD-10-CM

## 2022-01-26 PROCEDURE — 76830 TRANSVAGINAL US NON-OB: CPT

## 2022-01-26 PROCEDURE — 76856 US EXAM PELVIC COMPLETE: CPT

## 2022-01-31 NOTE — PROGRESS NOTES
Virtual visit to discuss ultrasond results from 1/26/22    1. Have you been to the ER, urgent care clinic since your last visit? Hospitalized since your last visit?  no    2. Have you seen or consulted any other health care providers outside of the [address] [place] [address] since your last visit? Include any pap smears or colon screening.    no

## 2022-02-01 ENCOUNTER — VIRTUAL VISIT (OUTPATIENT)
Dept: GYNECOLOGY | Age: 62
End: 2022-02-01
Payer: MEDICAID

## 2022-02-01 DIAGNOSIS — N83.8 OVARIAN MASS, LEFT: ICD-10-CM

## 2022-02-01 DIAGNOSIS — N83.8 OVARIAN MASS: Primary | ICD-10-CM

## 2022-02-01 PROCEDURE — 99214 OFFICE O/P EST MOD 30 MIN: CPT | Performed by: OBSTETRICS & GYNECOLOGY

## 2022-02-01 NOTE — PROGRESS NOTES
[address] [place] [address] [address], [address]  P [phone]  F [phone]    Office Note  Patient ID:  Name:  Lester Koo  MRN:  [id number]  :  1960/62 y.o. Date:  2022      HISTORY OF PRESENT ILLNESS:  Ms. Lester Koo is a 57 y.o. postmenopausal female who presents as a new patient from Dr. Sandi Gonzalez for a pelvic mass. The patient was admitted to [facility] in November for what is reported as shaking and hypotension. The patient reports a history of chronic back pain. She reports that she was at a family gathering when she just was not feeling well, nauseous, and vomited. Denies CP or SOB at that time. CT A/P on 2021 during her workup noted a \"question left ovarian lesion with calcification. \" The patient was referred for this reason. The patient is status post a hysterectomy. The patient has a chronic history of constipation and diarrhea. Interval History:  Presents back today for imaging results. Imaging Review:   Pelvic ultrasound 2022:  FINDINGS:  Transabdominal and transvaginal imaging of the pelvis was performed. On transabdominal imaging, the uterus is surgically absent. The ovaries are  obscured by bowel gas. No significant pelvic free fluid is visualized. On transvaginal imaging, the uterus is surgically absent. The right ovary is  obscured by bowel gas. Questionable calcified focus in the region of the left  adnexa. No significant pelvic free fluid  IMPRESSION  Uterus is surgically absent. Right ovary is not visualized. Questionable  calcified focus in the region of the left adnexa of uncertain significance    CT A/P 2021:  Impression:   1. Findings are suspicious for a left paraduodenal hernia. No evidence of bowel obstruction at this time. Visualization of the bowel is limited due to lack of contrast and extensive artifact from lumbar surgical spinal fusion. 2. Non-obstructing right nephrolithiasis.    3. Question left ovarian lesion with calcification. If the patient is not status post oophorectomy, short-term follow-up with pelvic ultrasound should be considered for further characterization. ROS:  A comprehensive review of systems was negative except for that written in the History of Present Illness.  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    TIA (transient ischemic attack) 1991     PMH:  Past Medical History:   Diagnosis Date    Adverse effect of anesthesia     HARD TO WAKE UP AFTER ANESTHESIA X 1    Arthritis     back and hips    Chronic obstructive pulmonary disease (HCC)     no medications    GERD (gastroesophageal reflux disease)     Heart murmur     History of shingles     Hypertension     Idiopathic hematuria 12/4/2019    Menopause     Migraines     PUD (peptic ulcer disease)     TIA (transient ischemic attack) 1991    NO DEFICITS    White coat syndrome with diagnosis of hypertension       PSH:  Past Surgical History:   Procedure Laterality Date    COLONOSCOPY N/A 10/13/2021    COLONOSCOPY performed by Becky Beatty MD at [facility]; HI RISK IND  10/13/2021         HX CHOLECYSTECTOMY      HX HYSTERECTOMY      HX LUMBAR FUSION  12/2019    HX OTHER SURGICAL      THORACIC FUSIONS X 2     HX THORACIC LAMINECTOMY  2005, 2015    fusion x 2      Social History:  Social History     Tobacco Use    Smoking status: Current Every Day Smoker     Packs/day: 1.00     Years: 40.00     Pack years: 40.00    Smokeless tobacco: Never Used   Substance Use Topics    Alcohol use: Not Currently     Comment: SOCIAL       Family History:  Family History   Problem Relation Age of Onset    Heart Disease Mother     Asthma Mother    [de-identified] Hypertension Mother     Cancer Father         STOMACH CANCER    Diabetes Sister     High Cholesterol Sister     Obesity Sister     Hypertension Sister     Diabetes Sister     Anemia Sister     OSTEOARTHRITIS Sister     Anesth Problems Neg Hx       Medications: (reviewed)  Current Outpatient Medications   Medication Sig    traMADoL (ULTRAM) 50 mg tablet     amLODIPine (NORVASC) 10 mg tablet Take 10 mg by mouth daily.  diclofenac EC (VOLTAREN) 75 mg EC tablet Take 75 mg by mouth two (2) times a day.  senna-docusate (PERICOLACE) 8.6-50 mg per tablet Take 1 Tab by mouth two (2) times a day.  SUMAtriptan (IMITREX) 100 mg tablet Take 100 mg by mouth once as needed for Migraine.  cholecalciferol, vitamin D3, (VITAMIN D3) 2,000 unit tab Take  by mouth daily.  atorvastatin (LIPITOR) 10 mg tablet Take 10 mg by mouth daily. Indications: high cholesterol    lisinopril (PRINIVIL, ZESTRIL) 20 mg tablet Take 20 mg by mouth daily. No current facility-administered medications for this visit. Allergies: (reviewed)  Allergies   Allergen Reactions    Shellfish Derived Angioedema     CAN EAT CLAMS, SHRIMP AND OYSTERS, BUT NO CRAB NO LOBSTER     Iodine Swelling    Codeine Nausea Only        OBJECTIVE:    Physical Exam:  VITAL SIGNS: There were no vitals filed for this visit. There is no height or weight on file to calculate BMI. GENERAL ENEDINA: Conversant, alert, oriented. No acute distress. HEENT: HEENT. No thyroid enlargement. No JVD. Neck: Supple without restrictions. RESPIRATORY: Clear to auscultation and percussion to the bases. No CVAT. CARDIOVASC: RRR without murmur/rub.    GASTROINT: soft, non-tender, without masses or organomegaly   MUSCULOSKEL: no joint tenderness, deformity or swelling       EXTREMITIES: extremities normal, atraumatic, no cyanosis or edema   PELVIC: Exam chaperoned by nurse. Normal appearing external genitalia. On speculum exam, atrophic appearing vagina. Surgically absent cervix and uterus. On bimanual exam, surgically absent uterus and cervix. No evidence of adnexal masses or nodularity. RECTAL: deferred   DAVID SURVEY: No suspicious lymphadenopathy or edema noted. NEURO: Grossly intact. No acute deficit. Lab Date as available:    Lab Results   Component Value Date/Time    WBC 6.5 11/27/2019 09:22 AM    HGB 7.5 (L) 12/13/2019 05:16 AM    HCT 37.5 11/27/2019 09:22 AM    PLATELET 085 28/17/1258 09:22 AM    MCV 85.4 11/27/2019 09:22 AM     Lab Results   Component Value Date/Time    Sodium 142 12/12/2019 03:06 AM    Potassium 4.2 12/12/2019 03:06 AM    Chloride 112 (H) 12/12/2019 03:06 AM    CO2 26 12/12/2019 03:06 AM    Anion gap 4 (L) 12/12/2019 03:06 AM    Glucose 117 (H) 12/12/2019 03:06 AM    BUN 27 (H) 12/12/2019 03:06 AM    Creatinine 1.00 12/12/2019 03:06 AM    BUN/Creatinine ratio 27 (H) 12/12/2019 03:06 AM    GFR est AA >60 12/12/2019 03:06 AM    GFR est non-AA 56 (L) 12/12/2019 03:06 AM    Calcium 8.2 (L) 12/12/2019 03:06 AM         IMPRESSION/PLAN:    Ms. Ernst Shields is a 57 y.o. female with a working diagnosis of \"question left ovarian lesion with calcification\" on CT A/P 11/22/2021. Follow-up pelvic ultrasound 1/26/2022 demonstrates \"Questionable calcified focus in the region of the left adnexa of uncertain significance\".       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 The patient remains asymptomatic and this areas remains persistent on pelvic ultrasound. It most likely represents her left ovary. I have recommended a pelvic MRI in 6 weeks to follow this area over time, but also for a better characterization. I suspect this is likely a calcified ovary, but I do believe it is prudent to continue to follow it at this point. I believe her risk of malignancy is low, <1%. This area has been stable since 11/2021. I discussed surgical resection as well, although the patient and I are both in agreement that we will continue with observation at this time. All questions and concerns were addressed with the patient and she is comfortable with the plan. An electronic signature was used to authenticate this note. Savanna Landers MD    Pursuant to the emergency declaration unde the [address] [place] [address] waiver authority and the [employer] and [employer] Act, this Virtual Visit was conducted, with patient's consent, to reduce the patient's risk of exposure to COVID-19 and provide continuity of care for an established patient. Patient identification was verified at the start of the visit: Yes    Services were provided through a video synchronous discussion virtually to substitute for in-person clinic visit. Patient was at her individual home, while the provider was in his/her respective office.     I spent at least 40 minutes with this established patient, and >50% of the time was spent counseling and/or coordinating care regarding pelvic mass    Savanna Landers MD

## 2022-03-18 PROBLEM — N83.8 OVARIAN MASS, LEFT: Status: ACTIVE | Noted: 2022-01-24

## 2022-03-18 PROBLEM — M48.061 LUMBAR SPINAL STENOSIS: Status: ACTIVE | Noted: 2019-12-11

## 2022-03-19 PROBLEM — M48.061 LUMBAR STENOSIS: Status: ACTIVE | Noted: 2019-12-11

## 2022-03-19 PROBLEM — N02.9 IDIOPATHIC HEMATURIA: Status: ACTIVE | Noted: 2019-12-04

## 2022-03-30 ENCOUNTER — HOSPITAL ENCOUNTER (OUTPATIENT)
Dept: MRI IMAGING | Age: 62
Discharge: HOME | End: 2022-03-30
Attending: OBSTETRICS & GYNECOLOGY
Payer: MEDICAID

## 2022-03-30 DIAGNOSIS — N83.8 OVARIAN MASS: ICD-10-CM

## 2022-03-30 PROCEDURE — 72197 MRI PELVIS W/O & W/DYE: CPT

## 2022-03-30 PROCEDURE — A9576 INJ PROHANCE MULTIPACK: HCPCS | Performed by: OBSTETRICS & GYNECOLOGY

## 2022-03-30 PROCEDURE — 74011250636 HC RX REV CODE- 250/636: Performed by: OBSTETRICS & GYNECOLOGY

## 2022-03-30 RX ADMIN — GADOTERIDOL 13 ML: 279.3 INJECTION, SOLUTION INTRAVENOUS at 13:21

## 2022-04-04 NOTE — PROGRESS NOTES
Virtual visit to discuss MRI results from 3/30/2022    1. Have you been to the ER, urgent care clinic since your last visit? Hospitalized since your last visit?  no    2. Have you seen or consulted any other health care providers outside of the [address] [place] [address] since your last visit? Include any pap smears or colon screening.    no

## 2022-04-05 ENCOUNTER — VIRTUAL VISIT (OUTPATIENT)
Dept: GYNECOLOGY | Age: 62
End: 2022-04-05
Payer: MEDICAID

## 2022-04-05 DIAGNOSIS — J44.9 CHRONIC OBSTRUCTIVE PULMONARY DISEASE, UNSPECIFIED COPD TYPE (HCC): ICD-10-CM

## 2022-04-05 DIAGNOSIS — N83.8 OVARIAN MASS, LEFT: Primary | ICD-10-CM

## 2022-04-05 PROCEDURE — 99214 OFFICE O/P EST MOD 30 MIN: CPT | Performed by: OBSTETRICS & GYNECOLOGY

## 2022-04-05 NOTE — PROGRESS NOTES
[address] [place] [address] Mathias Moritz [address], [address]  P [phone]  F [phone]    Office Note  Patient ID:  Name:  Layne Marquez  MRN:  [id number]  :  1960/62 y.o. Date:  2022      HISTORY OF PRESENT ILLNESS:  Ms. Layne Marquez is a 57 y.o. postmenopausal female who presents as a new patient from Dr. Emelia Olson for a pelvic mass. The patient was admitted to [facility] in November for what is reported as shaking and hypotension. The patient reports a history of chronic back pain. She reports that she was at a family gathering when she just was not feeling well, nauseous, and vomited. Denies CP or SOB at that time. CT A/P on 2021 during her workup noted a \"question left ovarian lesion with calcification. \" The patient was referred for this reason. The patient is status post a hysterectomy. The patient has a chronic history of constipation and diarrhea. Interval History:  Presents back today for imaging results. Imaging Review:   MRI pelvis 3/30/2022:  FINDINGS: Study is limited by motion as well as extensive artifact from the  patient's spinal cord stimulator and hardware within the lumbar spine. Patient  status post hysterectomy. There is extensive bowel interposed into the pelvis. T1 and T2 hypointense lesion is noted in the left pelvis best seen on series 4  image 20 as well as coronal T2 series 9 image 18. No definite enhancement is  identified on postcontrast images. IMPRESSION  T1 and T2 hypointense lesion in the left pelvis. No definite  enhancement is identified. Overall evaluation is significantly limited on this  study secondary to artifact from motion as well as hardware artifact. Consider  CT scan of the pelvis with contrast for further assessment    Pelvic ultrasound 2022:  FINDINGS:  Transabdominal and transvaginal imaging of the pelvis was performed. On transabdominal imaging, the uterus is surgically absent.  The ovaries are  obscured by bowel gas. No significant pelvic free fluid is visualized. On transvaginal imaging, the uterus is surgically absent. The right ovary is  obscured by bowel gas. Questionable calcified focus in the region of the left  adnexa. No significant pelvic free fluid  IMPRESSION  Uterus is surgically absent. Right ovary is not visualized. Questionable  calcified focus in the region of the left adnexa of uncertain significance    CT A/P 11/22/2021:  Impression:   1. Findings are suspicious for a left paraduodenal hernia. No evidence of bowel obstruction at this time. Visualization of the bowel is limited due to lack of contrast and extensive artifact from lumbar surgical spinal fusion. 2. Non-obstructing right nephrolithiasis. 3. Question left ovarian lesion with calcification. If the patient is not status post oophorectomy, short-term follow-up with pelvic ultrasound should be considered for further characterization. ROS:  A comprehensive review of systems was negative except for that written in the History of Present Illness.  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    TIA (transient ischemic attack) 1991     PMH:  Past Medical History:   Diagnosis Date    Adverse effect of anesthesia     HARD TO WAKE UP AFTER ANESTHESIA X 1    Arthritis     back and hips    Chronic obstructive pulmonary disease (HCC)     no medications    GERD (gastroesophageal reflux disease)     Heart murmur     History of shingles     Hypertension     Idiopathic hematuria 12/4/2019    Menopause     Migraines     PUD (peptic ulcer disease)     TIA (transient ischemic attack) 1991    NO DEFICITS    White coat syndrome with diagnosis of hypertension       PSH:  Past Surgical History:   Procedure Laterality Date    COLONOSCOPY N/A 10/13/2021    COLONOSCOPY performed by William Bryan MD at [facility]; HI RISK IND  10/13/2021         HX CHOLECYSTECTOMY      HX HYSTERECTOMY      HX LUMBAR FUSION  12/2019    HX OTHER SURGICAL      THORACIC FUSIONS X 2     HX THORACIC LAMINECTOMY  2005, 2015    fusion x 2      Social History:  Social History     Tobacco Use    Smoking status: Current Every Day Smoker     Packs/day: 1.00     Years: 40.00     Pack years: 40.00    Smokeless tobacco: Never Used   Substance Use Topics    Alcohol use: Not Currently     Comment: SOCIAL       Family History:  Family History   Problem Relation Age of Onset    Heart Disease Mother     Asthma Mother     Hypertension Mother     Cancer Father         STOMACH CANCER    Diabetes Sister     High Cholesterol Sister     Obesity Sister     Hypertension Sister     Diabetes Sister     Anemia Sister     OSTEOARTHRITIS Sister     Anesth Problems Neg Hx       Medications: (reviewed)  Current Outpatient Medications   Medication Sig    traMADoL (ULTRAM) 50 mg tablet     amLODIPine (NORVASC) 10 mg tablet Take 10 mg by mouth daily.  SUMAtriptan (IMITREX) 100 mg tablet Take 100 mg by mouth once as needed for Migraine.  cholecalciferol, vitamin D3, (VITAMIN D3) 2,000 unit tab Take  by mouth daily.  atorvastatin (LIPITOR) 10 mg tablet Take 10 mg by mouth daily. Indications: high cholesterol    lisinopril (PRINIVIL, ZESTRIL) 20 mg tablet Take 20 mg by mouth daily.  diclofenac EC (VOLTAREN) 75 mg EC tablet Take 75 mg by mouth two (2) times a day. No current facility-administered medications for this visit.      Allergies: (reviewed)  Allergies   Allergen Reactions    Shellfish Derived Angioedema     CAN EAT CLAMS, SHRIMP AND OYSTERS, BUT NO CRAB NO LOBSTER     Iodine Swelling    Codeine Nausea Only        OBJECTIVE:  *deferred today given video-conference visit for ongoing COVID-19 pandemic*   Physical Exam:  VITAL SIGNS: There were no vitals filed for this visit. There is no height or weight on file to calculate BMI. GENERAL ENEDINA: Conversant, alert, oriented. No acute distress. HEENT: HEENT. No thyroid enlargement. No JVD. Neck: Supple without restrictions. RESPIRATORY: Clear to auscultation and percussion to the bases. No CVAT. CARDIOVASC: RRR without murmur/rub. GASTROINT: soft, non-tender, without masses or organomegaly   MUSCULOSKEL: no joint tenderness, deformity or swelling       EXTREMITIES: extremities normal, atraumatic, no cyanosis or edema   PELVIC: Exam chaperoned by nurse. Normal appearing external genitalia. On speculum exam, atrophic appearing vagina. Surgically absent cervix and uterus. On bimanual exam, surgically absent uterus and cervix. No evidence of adnexal masses or nodularity. RECTAL: deferred   DAVID SURVEY: No suspicious lymphadenopathy or edema noted. NEURO: Grossly intact. No acute deficit.        Lab Date as available:    Lab Results   Component Value Date/Time    WBC 6.5 11/27/2019 09:22 AM    HGB 7.5 (L) 12/13/2019 05:16 AM    HCT 37.5 11/27/2019 09:22 AM    PLATELET 527 21/38/4736 09:22 AM    MCV 85.4 11/27/2019 09:22 AM     Lab Results   Component Value Date/Time    Sodium 142 12/12/2019 03:06 AM    Potassium 4.2 12/12/2019 03:06 AM    Chloride 112 (H) 12/12/2019 03:06 AM    CO2 26 12/12/2019 03:06 AM    Anion gap 4 (L) 12/12/2019 03:06 AM    Glucose 117 (H) 12/12/2019 03:06 AM    BUN 27 (H) 12/12/2019 03:06 AM    Creatinine 1.00 12/12/2019 03:06 AM    BUN/Creatinine ratio 27 (H) 12/12/2019 03:06 AM    GFR est AA >60 12/12/2019 03:06 AM    GFR est non-AA 56 (L) 12/12/2019 03:06 AM    Calcium 8.2 (L) 12/12/2019 03:06 AM         IMPRESSION/PLAN:    Ms. Jarek Martin is a 57 y.o. female with a working diagnosis of \"question left ovarian lesion with calcification\" on CT A/P 11/22/2021. Follow-up pelvic ultrasound 1/26/2022 demonstrates \"Questionable calcified focus in the region of the left adnexa of uncertain significance\". 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 Unfortunately her MRI had a lot of artifact related to patient movement. The patient remains asymptomatic and this areas remains persistent on pelvic ultrasound. It most likely represents her left ovary. I suspect this is likely a calcified ovary, but I do believe it is prudent to continue to follow it at this point. I believe her risk of malignancy is low, <1%. This area has been stable since 11/2021. I discussed surgical resection as well, although the patient and I are both in agreement that we will continue with observation at this time. Plan for repeat pelvic ultrasound in 4 months. Reviewed precautionary symptoms to return sooner. All questions and concerns were addressed with the patient and she is comfortable with the plan. An electronic signature was used to authenticate this note.      Stephany Carr MD    Pursuant to the emergency declaration unde the [facility] [address] and the [employer] General Act, this Virtual Visit was conducted, with patient's consent, to reduce the patient's risk of exposure to COVID-19 and provide continuity of care for an established patient. Patient identification was verified at the start of the visit: Yes    Services were provided through a video synchronous discussion virtually to substitute for in-person clinic visit. Patient was at her individual home, while the provider was in his/her respective office.     I spent at least 40 minutes with this established patient, and >50% of the time was spent counseling and/or coordinating care regarding pelvic mass    John Enriquez MD

## 2022-04-25 ENCOUNTER — HOSPITAL ENCOUNTER (OUTPATIENT)
Dept: MAMMOGRAPHY | Age: 62
Discharge: HOME | End: 2022-04-25
Payer: MEDICAID

## 2022-04-25 ENCOUNTER — TRANSCRIBE ORDER (OUTPATIENT)
Dept: MAMMOGRAPHY | Age: 62
End: 2022-04-25

## 2022-04-25 DIAGNOSIS — Z12.31 VISIT FOR SCREENING MAMMOGRAM: Primary | ICD-10-CM

## 2022-04-25 DIAGNOSIS — Z12.31 VISIT FOR SCREENING MAMMOGRAM: ICD-10-CM

## 2022-04-25 PROCEDURE — 77067 SCR MAMMO BI INCL CAD: CPT

## 2023-04-29 RX ORDER — ATORVASTATIN CALCIUM 10 MG/1
TABLET, FILM COATED ORAL DAILY
COMMUNITY

## 2023-04-29 RX ORDER — DICLOFENAC SODIUM 75 MG/1
TABLET, DELAYED RELEASE ORAL 2 TIMES DAILY
COMMUNITY

## 2023-04-29 RX ORDER — AMLODIPINE BESYLATE 10 MG/1
TABLET ORAL DAILY
COMMUNITY

## 2023-04-29 RX ORDER — LISINOPRIL 20 MG/1
TABLET ORAL DAILY
COMMUNITY

## 2023-04-29 RX ORDER — TRAMADOL HYDROCHLORIDE 50 MG/1
TABLET ORAL
COMMUNITY
Start: 2021-12-22

## 2023-04-29 RX ORDER — SUMATRIPTAN 100 MG/1
TABLET, FILM COATED ORAL
COMMUNITY

## 2023-06-05 ENCOUNTER — TRANSCRIBE ORDERS (OUTPATIENT)
Facility: HOSPITAL | Age: 63
End: 2023-06-05

## 2023-06-05 DIAGNOSIS — Z12.31 ENCOUNTER FOR SCREENING MAMMOGRAM FOR MALIGNANT NEOPLASM OF BREAST: Primary | ICD-10-CM

## 2023-07-05 ENCOUNTER — HOSPITAL ENCOUNTER (OUTPATIENT)
Facility: HOSPITAL | Age: 63
Discharge: HOME | End: 2023-07-08
Payer: MEDICARE

## 2023-07-05 DIAGNOSIS — Z12.31 ENCOUNTER FOR SCREENING MAMMOGRAM FOR MALIGNANT NEOPLASM OF BREAST: ICD-10-CM

## 2023-07-05 PROCEDURE — 77067 SCR MAMMO BI INCL CAD: CPT

## 2023-10-03 NOTE — PROGRESS NOTES
Care Management     Transition of Care Plan  · Patient will go home with home health for PT and OT. · [facility] [place] patient. Please notify them of day of discharge. · Family will transport her home. Met with patient in the room to obtain freedom of choice. Patient selected [address] [place] [address]. [address]. They do not go to the area of patient's [address]. Called [facility]. They asked to send the referral Sent referral via [employer]. Await response. [facility] does go to her home address of [address]. They cannot process the referral this evening. CM would need to contact weekend intake-[facility]. [facility]. Spoke with patient in room. Patient selected [facility]. She signed freedom of choice letter. CM placed in chart. Place name and number of agency on AVS.         The Plan for Transition of Care is related to the following treatment goals: Home Health    The Patient was provided with a choice of provider and agrees   with the discharge plan. [x] Yes [] No    Freedom of choice list was provided with basic dialogue that supports the patient's individualized plan of care/goals, treatment preferences and shares the quality data associated with the providers.  [x] Yes [] No    Iglesia Art, MSW No

## 2024-05-08 ENCOUNTER — TRANSCRIBE ORDERS (OUTPATIENT)
Facility: HOSPITAL | Age: 64
End: 2024-05-08

## 2024-05-08 DIAGNOSIS — N18.30 STAGE 3 CHRONIC KIDNEY DISEASE, UNSPECIFIED WHETHER STAGE 3A OR 3B CKD (HCC): Primary | ICD-10-CM

## 2024-05-15 ENCOUNTER — HOSPITAL ENCOUNTER (OUTPATIENT)
Facility: HOSPITAL | Age: 64
Discharge: HOME | End: 2024-05-18
Payer: MEDICARE

## 2024-05-15 DIAGNOSIS — N18.30 STAGE 3 CHRONIC KIDNEY DISEASE, UNSPECIFIED WHETHER STAGE 3A OR 3B CKD (HCC): ICD-10-CM

## 2024-05-15 PROCEDURE — 76770 US EXAM ABDO BACK WALL COMP: CPT

## 2024-07-24 ENCOUNTER — TRANSCRIBE ORDERS (OUTPATIENT)
Facility: HOSPITAL | Age: 64
End: 2024-07-24

## 2024-07-24 DIAGNOSIS — Z12.2 SCREENING FOR MALIGNANT NEOPLASM OF RESPIRATORY ORGAN: Primary | ICD-10-CM

## 2024-08-05 ENCOUNTER — HOSPITAL ENCOUNTER (OUTPATIENT)
Facility: HOSPITAL | Age: 64
Discharge: HOME | End: 2024-08-08
Payer: MEDICARE

## 2024-08-05 DIAGNOSIS — Z12.2 SCREENING FOR MALIGNANT NEOPLASM OF RESPIRATORY ORGAN: ICD-10-CM

## 2024-08-05 PROCEDURE — 71271 CT THORAX LUNG CANCER SCR C-: CPT

## 2024-08-30 ENCOUNTER — HOSPITAL ENCOUNTER (OUTPATIENT)
Facility: HOSPITAL | Age: 64
End: 2024-08-30
Payer: MEDICARE

## 2024-08-30 VITALS — BODY MASS INDEX: 22.49 KG/M2 | WEIGHT: 135 LBS | HEIGHT: 65 IN

## 2024-08-30 DIAGNOSIS — Z12.31 ENCOUNTER FOR SCREENING MAMMOGRAM FOR MALIGNANT NEOPLASM OF BREAST: ICD-10-CM

## 2024-08-30 PROCEDURE — 77063 BREAST TOMOSYNTHESIS BI: CPT

## 2024-08-30 PROCEDURE — 77067 SCR MAMMO BI INCL CAD: CPT

## (undated) DEVICE — CATH IV AUTOGRD BC PNK 20GA 25 -- INSYTE

## (undated) DEVICE — BONE WAX WHITE: Brand: BONE WAX WHITE

## (undated) DEVICE — Device

## (undated) DEVICE — ADHESIVE SKIN CLOSURE 4X22 CM PREMIERPRO EXOFINFUSION DISP

## (undated) DEVICE — SYR 3ML LL TIP 1/10ML GRAD --

## (undated) DEVICE — STERILE POLYISOPRENE POWDER-FREE SURGICAL GLOVES WITH EMOLLIENT COATING: Brand: PROTEXIS

## (undated) DEVICE — GARMENT,MEDLINE,DVT,INT,CALF,MED, GEN2: Brand: MEDLINE

## (undated) DEVICE — TOTAL TRAY, 16FR 10ML SIL FOLEY, URN: Brand: MEDLINE

## (undated) DEVICE — SURGIFOAM SPNG SZ 100

## (undated) DEVICE — ELECTRODE,RADIOTRANSLUCENT,FOAM,5PK: Brand: MEDLINE

## (undated) DEVICE — KIT EVAC 0.13IN RECT TB DIA10FR 400CC PVC 3 SPR Y CONN DRN

## (undated) DEVICE — SOLUTION IRRIG 3000ML 0.9% SOD CHL FLX CONT 0797208] ICU MEDICAL INC]

## (undated) DEVICE — SOLUTION IV 250ML 0.9% SOD CHL CLR INJ FLX BG CONT PRT CLSR

## (undated) DEVICE — TOOL 14BA50 LEGEND 14CM 5MM BA: Brand: MIDAS REX ™

## (undated) DEVICE — 4-PORT MANIFOLD: Brand: NEPTUNE 2

## (undated) DEVICE — FLOSEAL HEMOSTATIC MATRIX, 10 ML: Brand: FLOSEAL

## (undated) DEVICE — INFECTION CONTROL KIT SYS

## (undated) DEVICE — SUTURE VCRL SZ 3-0 L27IN ABSRB UD CP-2 L26MM 1/2 CIR REV J868H

## (undated) DEVICE — SUTURE ABSRB BRAID COAT UD OS-6 NO 1 27IN VCRL J535H

## (undated) DEVICE — SET ADMIN 16ML TBNG L100IN 2 Y INJ SITE IV PIGGY BK DISP

## (undated) DEVICE — PREP SKN PREVAIL 40ML APPL --

## (undated) DEVICE — HANDPIECE SET WITH BONE CLEANING TIP AND SUCTION TUBE: Brand: INTERPULSE

## (undated) DEVICE — 1200 GUARD II KIT W/5MM TUBE W/O VAC TUBE: Brand: GUARDIAN

## (undated) DEVICE — NEONATAL-ADULT SPO2 SENSOR: Brand: NELLCOR

## (undated) DEVICE — COVER,TABLE,HEAVY DUTY,60"X90",STRL: Brand: MEDLINE

## (undated) DEVICE — SOLIDIFIER FLD 2OZ 1500CC N DISINF IN BTL DISP SAFESORB

## (undated) DEVICE — POSITIONER HD REST FOAM CMFRT TCH

## (undated) DEVICE — SYR 10ML LUER LOK 1/5ML GRAD --

## (undated) DEVICE — STRAP,POSITIONING,KNEE/BODY,FOAM,4X60": Brand: MEDLINE

## (undated) DEVICE — GOWN,SIRUS,NONRNF,SETINSLV,2XL,18/CS: Brand: MEDLINE

## (undated) DEVICE — BASIN EMSIS 16OZ GRAPHITE PLAS KID SHP MOLD GRAD FOR ORAL

## (undated) DEVICE — LAMINECTOMY RICHMOND-LF: Brand: MEDLINE INDUSTRIES, INC.

## (undated) DEVICE — SPONGE GZ W4XL4IN COT 12 PLY TYP VII WVN C FLD DSGN

## (undated) DEVICE — PAD BD MATTRESS 73X32 IN STD CONVOLUTED FOAM LTX FREE

## (undated) DEVICE — NEEDLE HYPO 18GA L1.5IN PNK S STL HUB POLYPR SHLD REG BVL

## (undated) DEVICE — BIPOLAR IRRIGATOR INTEGRATED TUBING AND BIPOLAR CORD SET, DISPOSABLE

## (undated) DEVICE — Z DISCONTINUED PER MEDLINE LINE GAS SAMPLING O2/CO2 LNG AD 13 FT NSL W/ TBNG FILTERLINE

## (undated) DEVICE — SUTURE VCRL 2-0 L27IN ABSRB UD CP-2 L26MM 1/2 CIR REV CUT J869H

## (undated) DEVICE — TOWEL 4 PLY TISS 19X30 SUE WHT